# Patient Record
Sex: FEMALE | Race: WHITE | HISPANIC OR LATINO | Employment: FULL TIME | URBAN - METROPOLITAN AREA
[De-identification: names, ages, dates, MRNs, and addresses within clinical notes are randomized per-mention and may not be internally consistent; named-entity substitution may affect disease eponyms.]

---

## 2017-04-18 ENCOUNTER — ALLSCRIPTS OFFICE VISIT (OUTPATIENT)
Dept: OTHER | Facility: OTHER | Age: 32
End: 2017-04-18

## 2017-04-18 DIAGNOSIS — E66.3 OVERWEIGHT(278.02): ICD-10-CM

## 2017-06-15 ENCOUNTER — ALLSCRIPTS OFFICE VISIT (OUTPATIENT)
Dept: OTHER | Facility: OTHER | Age: 32
End: 2017-06-15

## 2017-08-14 ENCOUNTER — LAB REQUISITION (OUTPATIENT)
Dept: LAB | Facility: HOSPITAL | Age: 32
End: 2017-08-14
Payer: COMMERCIAL

## 2017-08-14 ENCOUNTER — ALLSCRIPTS OFFICE VISIT (OUTPATIENT)
Dept: OTHER | Facility: OTHER | Age: 32
End: 2017-08-14

## 2017-08-14 DIAGNOSIS — Z11.3 ENCOUNTER FOR SCREENING FOR INFECTIONS WITH PREDOMINANTLY SEXUAL MODE OF TRANSMISSION: ICD-10-CM

## 2017-08-14 DIAGNOSIS — Z01.419 ENCOUNTER FOR GYNECOLOGICAL EXAMINATION WITHOUT ABNORMAL FINDING: ICD-10-CM

## 2017-08-14 DIAGNOSIS — Z72.51 HIGH RISK HETEROSEXUAL BEHAVIOR: ICD-10-CM

## 2017-08-14 PROCEDURE — G0145 SCR C/V CYTO,THINLAYER,RESCR: HCPCS | Performed by: NURSE PRACTITIONER

## 2017-08-14 PROCEDURE — 87624 HPV HI-RISK TYP POOLED RSLT: CPT | Performed by: NURSE PRACTITIONER

## 2017-08-14 PROCEDURE — 87591 N.GONORRHOEAE DNA AMP PROB: CPT | Performed by: NURSE PRACTITIONER

## 2017-08-14 PROCEDURE — 87491 CHLMYD TRACH DNA AMP PROBE: CPT | Performed by: NURSE PRACTITIONER

## 2017-08-16 ENCOUNTER — APPOINTMENT (OUTPATIENT)
Dept: LAB | Facility: CLINIC | Age: 32
End: 2017-08-16
Payer: COMMERCIAL

## 2017-08-16 ENCOUNTER — TRANSCRIBE ORDERS (OUTPATIENT)
Dept: LAB | Facility: CLINIC | Age: 32
End: 2017-08-16

## 2017-08-16 DIAGNOSIS — Z11.3 ENCOUNTER FOR SCREENING FOR INFECTIONS WITH PREDOMINANTLY SEXUAL MODE OF TRANSMISSION: ICD-10-CM

## 2017-08-16 DIAGNOSIS — E66.9 OBESITY, UNSPECIFIED: ICD-10-CM

## 2017-08-16 DIAGNOSIS — E66.9 OBESITY, UNSPECIFIED: Primary | ICD-10-CM

## 2017-08-16 LAB
25(OH)D3 SERPL-MCNC: 26.5 NG/ML (ref 30–100)
ALBUMIN SERPL BCP-MCNC: 3.6 G/DL (ref 3.5–5)
ALP SERPL-CCNC: 88 U/L (ref 46–116)
ALT SERPL W P-5'-P-CCNC: 29 U/L (ref 12–78)
ANION GAP SERPL CALCULATED.3IONS-SCNC: 7 MMOL/L (ref 4–13)
AST SERPL W P-5'-P-CCNC: 13 U/L (ref 5–45)
BILIRUB SERPL-MCNC: 0.32 MG/DL (ref 0.2–1)
BUN SERPL-MCNC: 8 MG/DL (ref 5–25)
CALCIUM SERPL-MCNC: 9.1 MG/DL (ref 8.3–10.1)
CHLAMYDIA DNA CVX QL NAA+PROBE: NORMAL
CHLORIDE SERPL-SCNC: 106 MMOL/L (ref 100–108)
CHOLEST SERPL-MCNC: 150 MG/DL (ref 50–200)
CO2 SERPL-SCNC: 26 MMOL/L (ref 21–32)
CREAT SERPL-MCNC: 0.79 MG/DL (ref 0.6–1.3)
EST. AVERAGE GLUCOSE BLD GHB EST-MCNC: 111 MG/DL
GFR SERPL CREATININE-BSD FRML MDRD: 100 ML/MIN/1.73SQ M
GLUCOSE P FAST SERPL-MCNC: 90 MG/DL (ref 65–99)
HBA1C MFR BLD: 5.5 % (ref 4.2–6.3)
HDLC SERPL-MCNC: 38 MG/DL (ref 40–60)
HPV RRNA GENITAL QL NAA+PROBE: NORMAL
LDLC SERPL CALC-MCNC: 80 MG/DL (ref 0–100)
N GONORRHOEA DNA GENITAL QL NAA+PROBE: NORMAL
POTASSIUM SERPL-SCNC: 3.8 MMOL/L (ref 3.5–5.3)
PROT SERPL-MCNC: 8.1 G/DL (ref 6.4–8.2)
SODIUM SERPL-SCNC: 139 MMOL/L (ref 136–145)
TRIGL SERPL-MCNC: 160 MG/DL
TSH SERPL DL<=0.05 MIU/L-ACNC: 0.71 UIU/ML (ref 0.36–3.74)

## 2017-08-16 PROCEDURE — 80053 COMPREHEN METABOLIC PANEL: CPT

## 2017-08-16 PROCEDURE — 82306 VITAMIN D 25 HYDROXY: CPT

## 2017-08-16 PROCEDURE — 36415 COLL VENOUS BLD VENIPUNCTURE: CPT

## 2017-08-16 PROCEDURE — 86592 SYPHILIS TEST NON-TREP QUAL: CPT

## 2017-08-16 PROCEDURE — 83036 HEMOGLOBIN GLYCOSYLATED A1C: CPT

## 2017-08-16 PROCEDURE — 87340 HEPATITIS B SURFACE AG IA: CPT

## 2017-08-16 PROCEDURE — 80061 LIPID PANEL: CPT

## 2017-08-16 PROCEDURE — 87389 HIV-1 AG W/HIV-1&-2 AB AG IA: CPT

## 2017-08-16 PROCEDURE — 84443 ASSAY THYROID STIM HORMONE: CPT

## 2017-08-17 LAB
HBV SURFACE AG SER QL: NORMAL
RPR SER QL: NORMAL

## 2017-08-18 LAB
HIV 1+2 AB+HIV1 P24 AG SERPL QL IA: NORMAL
LAB AP GYN PRIMARY INTERPRETATION: NORMAL
Lab: NORMAL

## 2018-01-11 NOTE — MISCELLANEOUS
Provider Comments  Provider Comments:   Patient missed her 8:40am appt and was rescheduled to 11:40 [address] [facility]        Signatures   Electronically signed by : Jenene Krabbe, PAC; Jun 16 2017  7:59AM EST

## 2018-01-12 VITALS
TEMPERATURE: 96.4 F | OXYGEN SATURATION: 98 % | RESPIRATION RATE: 18 BRPM | HEIGHT: 65 IN | SYSTOLIC BLOOD PRESSURE: 120 MMHG | DIASTOLIC BLOOD PRESSURE: 70 MMHG | BODY MASS INDEX: 27.24 KG/M2 | HEART RATE: 84 BPM | WEIGHT: 163.5 LBS

## 2018-01-13 VITALS
SYSTOLIC BLOOD PRESSURE: 114 MMHG | WEIGHT: 164 LBS | DIASTOLIC BLOOD PRESSURE: 75 MMHG | HEIGHT: 65 IN | BODY MASS INDEX: 27.32 KG/M2

## 2018-01-14 VITALS
RESPIRATION RATE: 18 BRPM | HEIGHT: 65 IN | DIASTOLIC BLOOD PRESSURE: 78 MMHG | SYSTOLIC BLOOD PRESSURE: 114 MMHG | BODY MASS INDEX: 27.85 KG/M2 | HEART RATE: 80 BPM | OXYGEN SATURATION: 100 % | WEIGHT: 167.13 LBS | TEMPERATURE: 96.8 F

## 2018-08-28 ENCOUNTER — APPOINTMENT (OUTPATIENT)
Dept: LAB | Facility: CLINIC | Age: 33
End: 2018-08-28
Payer: COMMERCIAL

## 2018-08-28 ENCOUNTER — OFFICE VISIT (OUTPATIENT)
Dept: FAMILY MEDICINE CLINIC | Facility: CLINIC | Age: 33
End: 2018-08-28
Payer: COMMERCIAL

## 2018-08-28 ENCOUNTER — OFFICE VISIT (OUTPATIENT)
Dept: OBGYN CLINIC | Facility: CLINIC | Age: 33
End: 2018-08-28
Payer: COMMERCIAL

## 2018-08-28 ENCOUNTER — TRANSCRIBE ORDERS (OUTPATIENT)
Dept: LAB | Facility: CLINIC | Age: 33
End: 2018-08-28

## 2018-08-28 VITALS
BODY MASS INDEX: 26.17 KG/M2 | DIASTOLIC BLOOD PRESSURE: 74 MMHG | RESPIRATION RATE: 16 BRPM | OXYGEN SATURATION: 98 % | WEIGHT: 157.06 LBS | HEART RATE: 64 BPM | SYSTOLIC BLOOD PRESSURE: 110 MMHG | TEMPERATURE: 96.8 F | HEIGHT: 65 IN

## 2018-08-28 VITALS
DIASTOLIC BLOOD PRESSURE: 70 MMHG | WEIGHT: 156 LBS | BODY MASS INDEX: 25.99 KG/M2 | SYSTOLIC BLOOD PRESSURE: 102 MMHG | HEIGHT: 65 IN

## 2018-08-28 DIAGNOSIS — N92.1 METRORRHAGIA: ICD-10-CM

## 2018-08-28 DIAGNOSIS — Z11.3 SCREENING EXAMINATION FOR STD (SEXUALLY TRANSMITTED DISEASE): Primary | ICD-10-CM

## 2018-08-28 DIAGNOSIS — Z97.5 BREAKTHROUGH BLEEDING WITH IUD: ICD-10-CM

## 2018-08-28 DIAGNOSIS — N92.1 BREAKTHROUGH BLEEDING WITH IUD: ICD-10-CM

## 2018-08-28 DIAGNOSIS — Z02.4 DRIVER'S PERMIT PE (PHYSICAL EXAMINATION): Primary | ICD-10-CM

## 2018-08-28 DIAGNOSIS — M54.50 CHRONIC BILATERAL LOW BACK PAIN WITHOUT SCIATICA: ICD-10-CM

## 2018-08-28 DIAGNOSIS — G89.29 CHRONIC BILATERAL LOW BACK PAIN WITHOUT SCIATICA: ICD-10-CM

## 2018-08-28 LAB
B-HCG SERPL-ACNC: <2 MIU/ML
ERYTHROCYTE [DISTWIDTH] IN BLOOD BY AUTOMATED COUNT: 12.9 % (ref 11.6–15.1)
HCT VFR BLD AUTO: 37.2 % (ref 34.8–46.1)
HGB BLD-MCNC: 11.5 G/DL (ref 11.5–15.4)
MCH RBC QN AUTO: 27.2 PG (ref 26.8–34.3)
MCHC RBC AUTO-ENTMCNC: 30.9 G/DL (ref 31.4–37.4)
MCV RBC AUTO: 88 FL (ref 82–98)
PLATELET # BLD AUTO: 381 THOUSANDS/UL (ref 149–390)
PMV BLD AUTO: 10.7 FL (ref 8.9–12.7)
RBC # BLD AUTO: 4.23 MILLION/UL (ref 3.81–5.12)
TSH SERPL DL<=0.05 MIU/L-ACNC: 1.59 UIU/ML (ref 0.36–3.74)
WBC # BLD AUTO: 6.27 THOUSAND/UL (ref 4.31–10.16)

## 2018-08-28 PROCEDURE — 87591 N.GONORRHOEAE DNA AMP PROB: CPT | Performed by: OBSTETRICS & GYNECOLOGY

## 2018-08-28 PROCEDURE — 85027 COMPLETE CBC AUTOMATED: CPT

## 2018-08-28 PROCEDURE — 99214 OFFICE O/P EST MOD 30 MIN: CPT | Performed by: OBSTETRICS & GYNECOLOGY

## 2018-08-28 PROCEDURE — 84443 ASSAY THYROID STIM HORMONE: CPT

## 2018-08-28 PROCEDURE — 84702 CHORIONIC GONADOTROPIN TEST: CPT

## 2018-08-28 PROCEDURE — 87491 CHLMYD TRACH DNA AMP PROBE: CPT | Performed by: OBSTETRICS & GYNECOLOGY

## 2018-08-28 PROCEDURE — 99214 OFFICE O/P EST MOD 30 MIN: CPT | Performed by: PHYSICIAN ASSISTANT

## 2018-08-28 PROCEDURE — 36415 COLL VENOUS BLD VENIPUNCTURE: CPT

## 2018-08-28 NOTE — PROGRESS NOTES
Assessment/Plan:     Diagnoses and all orders for this visit:    Metrorrhagia  -     TSH, 3rd generation with Free T4 reflex; Future  -     hCG, quantitative; Future  -     CBC; Future  -     US pelvis complete w transvaginal; Future    Breakthrough bleeding with IUD  -     TSH, 3rd generation with Free T4 reflex; Future  -     hCG, quantitative; Future  -     CBC; Future  -     US pelvis complete w transvaginal; Future    Screening examination for STD (sexually transmitted disease)  -     Chlamydia/GC amplified DNA by PCR; Future            Discussed with patient options for contraception  Patient states she would like to use condoms if the Intrauterine device has to be removed  She did not want the Intrauterine device to be removed today  Breakthrough bleeding certainly could be because of the Intrauterine device but we will look for other causes including uterine fibroids as well as endometrial polyps  CBC, TSH an HCG was also ordered  She was given Aygestin 10 mg p o  Tablet daily to stop heavy bleeding  She was asked to follow up in the office in about 1-2 weeks for pelvic ultrasound  Subjective   Patient ID: Francisco Alexander is a 29 y o  female  Patient is a 56-year-old  2 para  who presents for problem visit  She is complaining of irregular periods since 2017  She had a ParaGard Intrauterine device placed in 2014 in [place]  She states that she gets a period at least 2 times per month  Her periods last for about 5 days  Her last period started on 2018  She describes it as light but she does change a pad and tampon every hr    OB History      Para Term  AB Living    3 2 2 0 1 2    SAB TAB Ectopic Multiple Live Births    1 0 0 0 1            Review of Systems   Constitutional: Negative  HENT: Negative  Eyes: Negative  Respiratory: Negative  Cardiovascular: Negative  Gastrointestinal: Negative  Endocrine: Negative  Genitourinary:        As noted in HPI   Musculoskeletal: Negative  Skin: Negative  Allergic/Immunologic: Negative  Neurological: Negative  Hematological: Negative  Psychiatric/Behavioral: Negative  Vitals:    18 0714   BP: 102/70         Physical Exam   Constitutional: She is oriented to person, place, and time  She appears well-developed and well-nourished  No distress  Genitourinary: Uterus normal  Pelvic exam was performed with patient supine  There is no rash, tenderness, lesion or injury on the right labia  There is no rash, tenderness, lesion or injury on the left labia  There is bleeding (moderate amount) in the vagina  No erythema or tenderness in the vagina  No signs of injury around the vagina  No vaginal discharge found  Right adnexum does not display mass, does not display tenderness and does not display fullness  Left adnexum does not display mass, does not display tenderness and does not display fullness  Cervix exhibits visible IUD strings  Cervix does not exhibit motion tenderness, lesion, discharge or polyp  Uterus is not enlarged or tender  Abdominal: Soft  She exhibits no distension  There is no tenderness  Neurological: She is alert and oriented to person, place, and time  Skin: Skin is warm and dry  Psychiatric: She has a normal mood and affect  Her behavior is normal          Menstrual History:  OB History      Para Term  AB Living    3 2 2 0 1 2    SAB TAB Ectopic Multiple Live Births    1 0 0 0 1           Patient's last menstrual period was 2018 (exact date)  Period Pattern: (!) Irregular (bleeds 2x month since 2017)  Menstrual Flow: Light  Menstrual Control: Thin pad, Maxi pad, Tampon  Menstrual Control Change Freq (Hours):  1  Dysmenorrhea: (!) Mild  Dysmenorrhea Symptoms: Cramping    The following portions of the patient's history were reviewed and updated as appropriate: allergies, current medications, past family history, past medical history, past social history, past surgical history and problem list         Counseling / Coordination of Care  Total time spent today30 minutes  minutes  Greater than 50% of total time was spent with the patient and / or family counseling and / or coordination of care

## 2018-08-28 NOTE — PROGRESS NOTES
Assessment/Plan:    Chronic bilateral low back pain without sciatica  Recommend PT and xray  Follow up after for recheck  Take tylenol for pain         Problem List Items Addressed This Visit        Other    Chronic bilateral low back pain without sciatica     Recommend PT and xray  Follow up after for recheck  Take tylenol for pain         Relevant Orders    Ambulatory referral to Physical Therapy    XR spine lumbar minimum 4 views non injury      Other Visit Diagnoses     's permit PE (physical examination)    -  Primary        permit form signed    Subjective:      Patient ID: Lrary Betancur is a 29 y o  female  HPI  Here for drivers permit  She needs referral to orthopedics for pain in the hip  She has been having this for many months  Pain is across the back bilaterally  No medications tried for it  Pain is sharp and constant  No activity makes it worse or better  The following portions of the patient's history were reviewed and updated as appropriate:   She  has no past medical history on file  She   Patient Active Problem List    Diagnosis Date Noted    Chronic bilateral low back pain without sciatica 08/28/2018     She  has a past surgical history that includes Cardiac catheterization and BREAST IMPLANT  Her family history includes Diabetes in her father; Hypertension in her father and mother  She  reports that she has never smoked  She has never used smokeless tobacco  She reports that she drinks alcohol  She reports that she does not use drugs  Current Outpatient Prescriptions   Medication Sig Dispense Refill    norethindrone (AYGESTIN) 5 mg tablet Take 1 tablet (5 mg total) by mouth daily 60 tablet 3     No current facility-administered medications for this visit  No current outpatient prescriptions on file prior to visit  No current facility-administered medications on file prior to visit  She has No Known Allergies       Review of Systems   Constitutional: Negative for activity change, appetite change, chills, fatigue and unexpected weight change  HENT: Negative for dental problem, ear pain, hearing loss and sore throat  Eyes: Negative for visual disturbance  Respiratory: Negative for cough and wheezing  Cardiovascular: Negative for chest pain  Gastrointestinal: Negative for abdominal pain, constipation, diarrhea and vomiting  Genitourinary: Negative for difficulty urinating and dysuria  Musculoskeletal: Negative for arthralgias, back pain and myalgias  Skin: Negative for rash  Neurological: Negative for dizziness and headaches  Psychiatric/Behavioral: Negative for behavioral problems  Objective:      /74 (BP Location: Right arm, Patient Position: Sitting, Cuff Size: Standard)   Pulse 64   Temp (!) 96 8 °F (36 °C) (Tympanic)   Resp 16   Ht 5' 5" (1 651 m)   Wt 71 2 kg (157 lb 1 oz)   LMP 08/22/2018 (Exact Date)   SpO2 98%   BMI 26 14 kg/m²          Physical Exam   Constitutional: She is oriented to person, place, and time  She appears well-developed and well-nourished  HENT:   Head: Normocephalic and atraumatic  Right Ear: External ear normal    Left Ear: External ear normal    Nose: Nose normal    Mouth/Throat: Oropharynx is clear and moist    Eyes: Conjunctivae are normal    Neck: Normal range of motion  Neck supple  No thyromegaly present  Cardiovascular: Normal rate, regular rhythm and normal heart sounds  No murmur heard  Pulmonary/Chest: Effort normal and breath sounds normal  No respiratory distress  Abdominal: Soft  Bowel sounds are normal  She exhibits no mass  There is no tenderness  There is no guarding  Musculoskeletal: Normal range of motion  She exhibits tenderness (tender over lumbar spine L3-4)  Lymphadenopathy:     She has no cervical adenopathy  Neurological: She is alert and oriented to person, place, and time  She displays normal reflexes  Skin: Skin is warm     Psychiatric: She has a normal mood and affect  Her behavior is normal    Nursing note and vitals reviewed

## 2018-08-31 LAB
CHLAMYDIA DNA CVX QL NAA+PROBE: NORMAL
N GONORRHOEA DNA GENITAL QL NAA+PROBE: NORMAL

## 2018-09-04 ENCOUNTER — TELEPHONE (OUTPATIENT)
Dept: OBGYN CLINIC | Facility: CLINIC | Age: 33
End: 2018-09-04

## 2018-09-04 NOTE — TELEPHONE ENCOUNTER
----- Message from Edmundo Herman MD sent at 9/4/2018  7:28 AM EDT -----  Notify pt negative gonorrhea and chlamydia testing

## 2018-09-05 ENCOUNTER — APPOINTMENT (OUTPATIENT)
Dept: LAB | Facility: HOSPITAL | Age: 33
End: 2018-09-05
Attending: OBSTETRICS & GYNECOLOGY
Payer: COMMERCIAL

## 2018-09-05 ENCOUNTER — TRANSCRIBE ORDERS (OUTPATIENT)
Dept: ADMINISTRATIVE | Facility: HOSPITAL | Age: 33
End: 2018-09-05

## 2018-09-05 ENCOUNTER — HOSPITAL ENCOUNTER (OUTPATIENT)
Dept: ULTRASOUND IMAGING | Facility: HOSPITAL | Age: 33
Discharge: HOME | End: 2018-09-05
Attending: OBSTETRICS & GYNECOLOGY
Payer: COMMERCIAL

## 2018-09-05 DIAGNOSIS — N92.1 BREAKTHROUGH BLEEDING WITH IUD: ICD-10-CM

## 2018-09-05 DIAGNOSIS — Z97.5 BREAKTHROUGH BLEEDING WITH IUD: ICD-10-CM

## 2018-09-05 DIAGNOSIS — Z11.3 SCREENING EXAMINATION FOR STD (SEXUALLY TRANSMITTED DISEASE): ICD-10-CM

## 2018-09-05 DIAGNOSIS — Z11.3 SCREENING EXAMINATION FOR STD (SEXUALLY TRANSMITTED DISEASE): Primary | ICD-10-CM

## 2018-09-05 DIAGNOSIS — N92.1 METRORRHAGIA: ICD-10-CM

## 2018-09-05 PROCEDURE — 76830 TRANSVAGINAL US NON-OB: CPT

## 2018-09-05 PROCEDURE — 87491 CHLMYD TRACH DNA AMP PROBE: CPT

## 2018-09-05 PROCEDURE — 87591 N.GONORRHOEAE DNA AMP PROB: CPT

## 2018-09-05 PROCEDURE — 76856 US EXAM PELVIC COMPLETE: CPT

## 2018-09-06 LAB
CHLAMYDIA DNA CVX QL NAA+PROBE: NORMAL
N GONORRHOEA DNA GENITAL QL NAA+PROBE: NORMAL

## 2018-09-11 ENCOUNTER — OFFICE VISIT (OUTPATIENT)
Dept: OBGYN CLINIC | Facility: CLINIC | Age: 33
End: 2018-09-11
Payer: COMMERCIAL

## 2018-09-11 VITALS — WEIGHT: 160.2 LBS | SYSTOLIC BLOOD PRESSURE: 104 MMHG | BODY MASS INDEX: 26.66 KG/M2 | DIASTOLIC BLOOD PRESSURE: 86 MMHG

## 2018-09-11 DIAGNOSIS — Z30.432 ENCOUNTER FOR IUD REMOVAL: Primary | ICD-10-CM

## 2018-09-11 DIAGNOSIS — N92.1 METRORRHAGIA: ICD-10-CM

## 2018-09-11 DIAGNOSIS — N92.1 BREAKTHROUGH BLEEDING ASSOCIATED WITH INTRAUTERINE DEVICE (IUD): ICD-10-CM

## 2018-09-11 DIAGNOSIS — Z97.5 BREAKTHROUGH BLEEDING ASSOCIATED WITH INTRAUTERINE DEVICE (IUD): ICD-10-CM

## 2018-09-11 PROCEDURE — 99213 OFFICE O/P EST LOW 20 MIN: CPT | Performed by: OBSTETRICS & GYNECOLOGY

## 2018-09-11 PROCEDURE — 58301 REMOVE INTRAUTERINE DEVICE: CPT | Performed by: OBSTETRICS & GYNECOLOGY

## 2018-09-11 NOTE — PROGRESS NOTES
Assessment/Plan     Diagnoses and all orders for this visit:    Encounter for IUD removal  -     Iud removal    Metrorrhagia    Breakthrough bleeding associated with intrauterine device (IUD)  -     Iud removal        Discussed with patient options to treat bleeding associated with the Intrauterine device including treatment with medication, expectant management versus removal of the device  Patient would like to have the Intrauterine device removed today  Patient would like to use condoms for contraception instead  Please see procedure note for Intrauterine device removal   Her last Pap smear was in 2017 and was normal   Follow-up in 1 month for annual gyn exam       Basim Tomas is an 29 y o  woman who presents for discussion of results  She has no new complaints today  Patient presented 2 weeks ago complaining of of bleeding in between menstrual cycles  She has had the ParaGard Intrauterine device since 2014  Patient complained of bleeding in between menstrual cycles lasting for about 5 days  She had laboratory testing including a pelvic ultrasound  Pelvic ultrasound shows no endometrial polyps or fibroids  Intrauterine device seems to be in the correct location  Laboratory testing for CBC, TSH an HCG was negative  OB History      Para Term  AB Living    3 2 2 0 1 2    SAB TAB Ectopic Multiple Live Births    1 0 0 0 1          The following portions of the patient's history were reviewed and updated as appropriate: allergies, current medications, past family history, past medical history, past social history, past surgical history and problem list       Review of Systems   Constitutional: Negative  HENT: Negative  Eyes: Negative  Respiratory: Negative  Cardiovascular: Negative  Gastrointestinal: Negative  Endocrine: Negative  Genitourinary:        As noted in HPI   Musculoskeletal: Negative  Skin: Negative  Allergic/Immunologic: Negative  Neurological: Negative  Hematological: Negative  Psychiatric/Behavioral: Negative  Objective    Vitals:    09/11/18 0938   BP: 104/86       Physical Exam   Constitutional: She is oriented to person, place, and time  She appears well-developed and well-nourished  No distress  Genitourinary: Uterus normal  Pelvic exam was performed with patient supine  There is no rash, tenderness, lesion or injury on the right labia  There is no rash, tenderness, lesion or injury on the left labia  No erythema, tenderness or bleeding in the vagina  No signs of injury around the vagina  No vaginal discharge found  Right adnexum does not display mass, does not display tenderness and does not display fullness  Left adnexum does not display mass, does not display tenderness and does not display fullness  Cervix exhibits visible IUD strings  Cervix does not exhibit motion tenderness, lesion, discharge or polyp  Uterus is not enlarged or tender  Abdominal: Soft  She exhibits no distension  There is no tenderness  Neurological: She is alert and oriented to person, place, and time  Skin: Skin is warm and dry  Psychiatric: She has a normal mood and affect  Her behavior is normal      Iud removal  Date/Time: 9/11/2018 10:03 AM  Performed by: Coretta Welch  Authorized by: Coretta Welch     Consent:     Consent obtained:  Written and verbal    Consent given by:  Patient and spouse    Procedure risks and benefits discussed: yes      Patient questions answered: yes      Patient agrees, verbalizes understanding, and wants to proceed: yes    Procedure:     Removed with no complications: yes      Removal due to mechanical complications of IUD: yes      Other reason for removal:  Bleeding              Counseling / Coordination of Care  Total time spent today20 minutes  minutes    Greater than 50% of total time was spent with the patient and / or family counseling and / or coordination of care

## 2018-10-09 ENCOUNTER — OFFICE VISIT (OUTPATIENT)
Dept: OBGYN CLINIC | Facility: CLINIC | Age: 33
End: 2018-10-09
Payer: COMMERCIAL

## 2018-10-09 VITALS
SYSTOLIC BLOOD PRESSURE: 110 MMHG | DIASTOLIC BLOOD PRESSURE: 74 MMHG | HEIGHT: 65 IN | WEIGHT: 159 LBS | BODY MASS INDEX: 26.49 KG/M2

## 2018-10-09 DIAGNOSIS — Z01.411 ENCNTR FOR GYN EXAM (GENERAL) (ROUTINE) W ABNORMAL FINDINGS: Primary | ICD-10-CM

## 2018-10-09 PROBLEM — N92.1 METRORRHAGIA: Status: RESOLVED | Noted: 2018-09-11 | Resolved: 2018-10-09

## 2018-10-09 PROBLEM — Z97.5 BREAKTHROUGH BLEEDING ASSOCIATED WITH INTRAUTERINE DEVICE (IUD): Status: RESOLVED | Noted: 2018-09-11 | Resolved: 2018-10-09

## 2018-10-09 PROBLEM — N92.1 BREAKTHROUGH BLEEDING ASSOCIATED WITH INTRAUTERINE DEVICE (IUD): Status: RESOLVED | Noted: 2018-09-11 | Resolved: 2018-10-09

## 2018-10-09 PROCEDURE — 99395 PREV VISIT EST AGE 18-39: CPT | Performed by: OBSTETRICS & GYNECOLOGY

## 2018-10-09 NOTE — PATIENT INSTRUCTIONS
Autoexamen del seno para las mujeres   CUIDADO AMBULATORIO:   Un autoexamen de seno  es pablo manera de revisar si renate mamas tienen protuberancias u otros cambios  Los autoexámenes de seno regulares pueden ayudarla a conocer cómo se dudley y se sienten renate senos normalmente  La mayoría de las protuberancias o cambios en el seno  no son cáncer, kayden usted siempre debería ir con nelson médico para que la revise  Por qué usted debe hacerse un autoexamen de seno:  El cáncer de seno es el tipo de cáncer más común en las mujeres  Aún si a usted le [employer], todavía puede seguir revisándose regularmente  Si usted sabe cómo se sienten y se dudley renate senos normalmente, eso podría ayudarle a determinar cuándo comunicarse con nelson médico  Es posible que pablo mamografía no detecte algún cáncer  Usted podría encontrar pablo protuberancia vern un autoexamen de seno que no se detectó con nelson mamografía  Cuándo debería usted realizarse un autoexamen de seno:  Marcello nelson calendario para que recuerde hacerse un autoexamen del seno en pablo fecha regular  Pablo forma fácil de recordar es realizar el autoexamen de seno en el mismo día cada mes  Si usted tiene [place], es posible que lo mejor sea que se roes un autoexamen 1 semana después de que terminó nelson periodo  [place] es cuando renate senos podrían estar menos inflamados, abultados o sensibles  Usted puede realizarse autoexámenes del seno regulares incluso si está dando de lactar o si tiene implantes en el seno  Pregúntele a nelson Terr[place] vitaminas y minerales son adecuados para usted  · Usted encuentra algún tipo de bulto o cambio en renate senos  · Usted tiene [employer], o le sale líquido de renate pezones  · Usted tiene preguntas o inquietudes acerca de nelson condición o cuidado  Cómo realizar un autoexamen de seno:   · Observe renate senos en un may  Observe el tamaño y la forma de cada seno y del pezón   Revise si hay inflamación, protuberancias, hoyuelos, piel descamada u otros cambios en la piel  Vigile los cambios en el pezón, francisca cuando tiene dolor o que comienza a hundirse  Apriete cuidadosamente ambos pezones y revise si sale líquido (que no sea [employer]) de ellos  Si usted detecta cualquiera de estos u otros cambios en renate senos, comuníquese con nelson médico  Revise renate senos mientras está sentada o saha en las 3 siguientes posiciones:    ¨ Cuelgue renate brazos en renate costados  ¨ Levante renate ajye y [place] detrás de nelson rishabh  ¨ Ejerza presión firme con renate jaye sobre renate caderas  Inclínese un poco hacia adelante mientras observa renate senos en el may  · Acuéstese y palpe renate senos  Cuando usted se [place], el tejido de renate senos se extiende uniformemente sobre nelson pecho  [place] facilita que usted sienta protuberancias o cualquier cosa que podría no ser normal para renate senos  Realice un autoexamen con un seno a la vez  ¨ Coloque pablo almohada pequeña o pablo toalla debajo de nelson hombro sondra  Coloque nelson brazo sondra detrás de nelson rishabh  ¨ Use los 3 dedos medios de nelson mano derecha  Use las yemas de los dedos, en la parte superior  La yema del dedo es la parte más sensible de nelson dedo  ¨ Es círculos pequeños para sentir el tejido del seno  Use las yemas de renate dedos para hacer círculos pequeños empalmados sobre renate senos y [place]  Use presión ligera, media y firme  [place], presione ligeramente  Después, presione con pablo presión media para sentir un poco más profundo en nelson seno  Por último, use presión firme para sentir nelson seno en lo más profundo  ¨ Examine el área completa de nelson seno  Examine el área del seno desde arriba hasta abajo donde usted siente las [place]  Jem Rinne pequeños con las yemas de los dedos, comenzando en la parte media de nelson axila  Jem Rinne subiendo y [place] el área del seno  Continúe hacia nelson seno, hasta llegar al [employer]   Examine toda el área desde la axila hasta el centro de nelson pecho ([place])  Deténgase en el centro de mcdowell pecho  ¨ Mueva la almohada o toalla hacia mcdowell hombro derecho y coloque mcdowell brazo derecho detrás de mcdowell rishabh  Use las yemas de los 3 dedos medios de mcdowell mano izquierda y repita los pasos anteriores para realizar un autoexamen en mcdowell seno derecho  Qué más se puede hacer para la revisión de problemas en el seno o del cáncer de seno:  Algunos expertos sugieren que las mujeres de 36 años de edad y mayores deberían realizarse pablo mamografía cada año  Otros sugieren [employer] 49 y 76 años de edad se maría pablo mamografía cada 2 años  Consulte con mcdowell médico sobre cuándo usted debería [employer]  Acuda a renate consultas de control con mcdowell médico según le indicaron  Mcdowell médico puede observarla e indicarle si usted está realizando mcdowell autoexamen correctamente  Anote renate preguntas para que se acuerde de hacerlas vern renate visitas  © 2017 [facility] Information is for End User's use only and may not be sold, redistributed or otherwise used for commercial purposes  All illustrations and images included in CareNotes® are the copyrighted property of A D A M , Inc  or Blaise Loomis  Esta información es sólo para uso en educación  Mcdowell intención no es darle un consejo médico sobre enfermedades o tratamientos  Colsulte con mcdowell Classie Barrios farmacéutico antes de seguir cualquier régimen médico para saber si es seguro y efectivo para usted  Visita de bienestar para los adultos   LO QUE NECESITA SABER:   ¿Qué es pablo visita de bienestar? Matt Malhotra de bienestar es cuando usted acude con un médico para que le maría exámenes de detección de problemas de [place]  También puede obtener asesoramiento sobre cómo mantenerse saludable  Anote renate preguntas para que se acuerde de hacerlas  Pregunte a mcdowell médico con qué frecuencia debería realizarse pablo visita de bienestar  ¿Qupe sucede en pablo visita de bienestar?   Mcdowell médico le preguntará sobre mcdowell kareen y nelson historia familiar [id number] [facility]  [place] incluye presión arterial julio, enfermedades del corazón y cáncer  El médico le preguntará si tiene síntomas que le preocupen, si cami y [place] de ánimo  También se le preguntará acerca del uso de medicamentos, suplementos, alimentos y alcohol  Es posible que le maría cualquiera de lo siguiente:  · Nelson peso  será revisado  Es posible que [facility] midan nelson altura para calcular nelson índice de masa corporal [facility])  El [facility] indica si tiene un peso saludable  · Se verificarán nelson presión arterial  y frecuencia cardíaca  También pueden revisar nelson temperatura  · Exámenes de [place] y [facility]  se podría realizar  Se podrían realizar exámenes de kelley para revisar los niveles de [facility]  Los niveles anormales de colesterol aumentan el riesgo de enfermedad del corazón y accidente cerebrovascular  Puede que también necesite pablo prueba de kelley u orina para revisar si tiene diabetes si usted está en mayor riesgo  Las pruebas de orina pueden hacerse para buscar signos de pablo infección o pablo enfermedad renal      · Un examen físico  incluye la comprobación de renate latidos del corazón y los pulmones con un estetoscopio  Nelson médico también podría revisarle la piel para buscar daños causados por el sol  · Pruebas de detección  podría recomendarse  Se realiza un examen de detección para detectar enfermedades que pueden no causar síntomas  Los exámenes de detección que necesite dependen de nelson edad, género, antecedentes familiares y hábitos de saundra  Por ejemplo, podrían recomendarle la exploración selectiva colorrectal si tiene 48 años o más  ¿Qué exámenes de detección necesito si soy pablo pauline? · El examen de Papanicolaou  se utiliza para detectar cáncer de sonny uterino  El examen del Papanicolaou por lo general se realiza entre 3 a 5 años dependiendo de nelson edad   Puede necesitarlo más a menudo si usted ha tenido [employer] de la prueba de Papanicolaou en el pasado  Pregunte a nelson médico con qué frecuencia debería realizarse un examen de Papanicolaou  · Pablo mamografía  es pablo radiografía de renate senos para detectar cáncer de mama  Los expertos recomiendan [address] cada 2 años empezando a los 48 años de [place]  Es probable que usted necesite [address] a los 52 años o antes si tiene riesgo alto de cáncer de seno  Hable con nelson médico sobre cuándo debe empezar con renate mamografías y con cuánta frecuencia las necesita  ¿Qué vacunas podría necesitar? · Debe recibir Aracelis Angles vacuna contra la influenza  todos los [place] tr[place]idad  La vacuna contra la influenza protege de la gripe  Varios tipos de virus causan la influenza  Debido a que los virus [place] con el [place], es necesaria la producción de nuevas vacunas cada año  · Debe recibir Aracelis Angles vacuna de refuerzo contra el tétanos-difteria (Td)  cada 10 años  Esta vacuna protege contra el tétanos y la difteria  El tétanos es pablo infección severa que puede causar trismo y espasmos musculares dolorosos  La difteria es pablo infección bacterial grave que produce pablo cubierta gruesa en la parte de atrás de la boca y garganta  · Debe recibir la vacuna contra el virus del papiloma humano (VPH)  si usted es pauline y [place] 19 y 32 años o es hombre y [place] 23 y 24 años y nunca la recibió  Esta vacuna protege contra la infección por VPH  El virus del papiloma humano o VPH es la infección más común que se transmite por contacto sexual  El virus del papiloma humano también podría provocar cáncer vaginal, del pene y del ano  · Debe recibir la vacuna antineumocócica  si tiene más de 72 años  La vacuna antineumocócica es pablo inyección que se administra para protegerlo contra pablo enfermedad neumocócica  La enfermedad neumocócica es pablo infección causada por la bacteria neumocócica  La infección puede causar neumonía, meningitis o pablo infección del oído      · Debe recibir la vacuna contra la culebrilla  si tiene 60 años o más, incluso si flores tenido culebrilla antes  La vacuna contra la culebrilla (herpes zóster) es pablo inyección usada para proteger contra el virus zóster que causa la varicela  Deborah es el mismo virus que causa la varicela  La culebrilla es un sarpullido doloroso que se desarrolla en personas que tuvieron varicela o flores estado expuestas al virus  ¿Cómo puedo comer de manera saludable? [place] es un modelo para planear comidas sanas  Muestra los tipos y cantidades de alimentos que deberían ir en un plato  Edson Simple y verduras representan alrededor de la mitad de nelson plato y los granos y proteínas representan la otra mitad  Se incluye pablo porción de productos lácteos al lado del plato  La cantidad de calorías y [address] de las porciones que usted necesita dependen de nelson edad, [place], peso y altura  Los ejemplos de alimentos saludables son:  · Consuma pablo variedad de verduras  francisca las de color kerri oscuro, ortega y [place]  Usted también puede incluir verduras enlatadas bajas en sodio (sal) y verduras congeladas sin mantequilla ni salsas TWEPOHQB  · Consuma pablo variedad de fruta frescas , las frutas enlatadas en 100% de jugo , fruta [place] y spring secos  · Incluya granos enteros  Por lo menos la mitad de los granos que usted consume deben ser granos de diane integral  Por ejemplo, panes de grano entero, pasta integral, arroz integral y cereales de grano entero francisca la elizabeth  · Consuma pablo variedad de alimentos con proteínas francisca mariscos (pescado y crustáceos), Amedeo Spittle y carne de ave sin piel (pavo y immanuel)  Ejemplos de petr magras incluyen pierna, paleta o lomo de puerco y sruthi, solomillo o, lomo de res y darrell Rosado de res extra [place]  Otros alimentos ricos en proteínas son los huevos y sustitutos de [place], frijoles, chícharos, productos de soya, nueces y semillas  · Elija productos lácteos bajos en grasa [employer] o del 1% o yogur, queso y requesón bajos en grasa  · Limite las grasas poco saludables,  francisca la [place], la margarina dura y la [place]  ¿Qué cantidad de actividad física necesito? Realice pablo actividad física por lo menos 30 minutos al día, la mayoría de los días de la [place]  Algunos de los ejercicios incluyen caminar, montar en bicicleta, bailar y nadar  Usted también puede realizar más actividad física usando las escaleras en vez de los ascensores o estacionarse más lejos cuando Daphine Furry a las tiendas  Incluya ejercicios para fortalecer los músculos 2 días a la semana  El ejercicio regular proporciona muchos beneficios para la kareen  [place] a controlar nelson peso y [employer] riesgo de diabetes tipo 2, presión arterial julio, enfermedad del corazón y accidente cerebrovascular  El ejercicio [employer] puede ayudar a mejorar nelson estado de ánimo  Pregunte a nelson médico acerca del mejor plan de ejercicio para usted  ¿Cuáles son Guajardo Roes generales de kareen y seguridad que maia seguir? · No fume  La nicotina y otras sustancias químicas que contienen los cigarrillos y cigarros pueden dañar los pulmones  Pida información a nelson médico si usted actualmente fuma y necesita ayuda para dejar de fumar  Los cigarrillos electrónicos o tabaco sin humo todavía contienen nicotina  Consulte con nelson médico antes de [employer]  · Limite el consumo de alcohol  Un trago equivale a 12 onzas de cerveza, 5 onzas de vino o 1 onza y ½ de licor  · Baje de peso, si es necesario  El sobrepeso aumenta el riesgo de ciertas condiciones de [place]  Estos incluyen enfermedad del corazón, presión arterial julio, diabetes tipo 2 y ciertos tipos de cáncer  · Proteja nelson piel  No tome el sol ni use reese de bronceado  Use protector solar con un SPF 13 o mayor  Aplíquese el bloqueador por lo menos 15 minutos antes de que vaya a estar al [employer]  Vuelva a aplicarse la crema solar cada 2 horas   Use ropa protectora, sombrero y lentes para el sol cuando se encuentre afuera  · Conduzca con seguridad  Use siempre nelson cinturón de seguridad  Asegúrese que todos en el karina usan el cinturón de seguridad  Un cinturón de seguridad puede salvar nelson saundra en nichelle de un accidente automovilístico  No use el celular cuando esté manejando  [place] puede hacer que se distraiga y causar un accidente  Es mejor que pare y se orille si necesita hacer pablo [place] Caroli un texto  · Practique el sexo seguro  Use condones de látex si es sexualmente [place] y tiene más de [place] and [place]  Nelson médico puede recomendar exámenes de detección de infecciones de transmisión sexual (ITS)  · Use un alfredo, un chaleco salvavidas y unos implementos de protección  Siempre use un alfredo al [employer] en bicicleta o motocicleta, esquiar o jugar deportes que podrían causar pablo lesión en la rishabh  Use implementos de protección cuando practique deportes  Use un chaleco salvavidas cuando esté en un bote o practicando actividades acuáticas  ACUERDOS SOBRE NELSON CUIDADO:   Usted tiene el derecho de ayudar a planear nelson cuidado  Aprenda todo lo que pueda sobre nelson condición y francisca darle tratamiento  Discuta renate opciones de tratamiento con renate médicos para decidir el cuidado que usted desea recibir  Usted siempre tiene el derecho de rechazar el tratamiento  Esta información es sólo para uso en educación  Nelson intención no es darle un consejo médico sobre enfermedades o tratamientos  Colsulte con nelson [place] R[place] farmacéutico antes de seguir cualquier régimen médico para saber si es seguro y efectivo para usted  © 2017 [address]  Information is for End User's use only and may not be sold, redistributed or otherwise used for commercial purposes  All illustrations and images included in CareNotes® are the copyrighted property of A D A M , Inc  or Blaise Loomis

## 2018-10-09 NOTE — PROGRESS NOTES
Assessment        Diagnoses and all orders for this visit:    Encntr for gyn exam (general) (routine) w abnormal findings                  Plan      All questions answered  Breast self exam technique reviewed and patient encouraged to perform self-exam monthly  Contraception: condoms  Discussed healthy lifestyle modifications  Educational material distributed  Follow up in 1 year  Follow up as needed  PAP deferred   Patient would like to use condoms for contraception instead  She declines continued treatment with hormonal therapy for abnormal uterine bleeding  She will do expectant management and keep track of her menstrual pattern and call if with any problems  Otherwise, she will follow up in 1 year  Subjective      Arpit Hardin is a 29 y o  female who presents for annual exam   She was seen 1 month ago and had an Intrauterine device removal due to heavy abnormal bleeding  Patient was also given norethindrone acetate to treat abnormal uterine bleeding  Patient is currently not taking any hormonal therapy and is currently not bleeding  Last PAP:  2017      Current contraception: condoms        Menstrual History:  OB History      Para Term  AB Living    3 2 2 0 1 2    SAB TAB Ectopic Multiple Live Births    1 0 0 0 1           Patient's last menstrual period was 10/02/2018  The following portions of the patient's history were reviewed and updated as appropriate: allergies, current medications, past family history, past medical history, past social history, past surgical history and problem list         Review of Systems   Constitutional: Negative  HENT: Negative  Eyes: Negative  Respiratory: Negative  Cardiovascular: Negative  Gastrointestinal: Negative  Endocrine: Negative  Genitourinary:        As noted in HPI   Musculoskeletal: Negative  Skin: Negative  Allergic/Immunologic: Negative  Neurological: Negative  Hematological: Negative  Psychiatric/Behavioral: Negative  Physical Exam   Constitutional: She is oriented to person, place, and time  She appears well-developed and well-nourished  Genitourinary: There is no rash or lesion on the right labia  There is no rash or lesion on the left labia  No bleeding in the vagina  No vaginal discharge found  Right adnexum does not display mass, does not display tenderness and does not display fullness  Left adnexum does not display mass, does not display tenderness and does not display fullness  Cervix does not exhibit motion tenderness, lesion or polyp  Uterus is not enlarged or tender  HENT:   Head: Normocephalic  Neck: No thyromegaly present  Cardiovascular: Normal rate, regular rhythm and normal heart sounds  No murmur heard  Pulmonary/Chest: Effort normal and breath sounds normal  No respiratory distress  She has no wheezes  She has no rales  Right breast exhibits no mass, no nipple discharge, no skin change and no tenderness  Left breast exhibits no mass, no nipple discharge, no skin change and no tenderness  B/l breast implants   Abdominal: Soft  She exhibits no distension and no mass  There is no tenderness  There is no rebound and no guarding  Musculoskeletal: She exhibits no edema or tenderness  Neurological: She is alert and oriented to person, place, and time  Skin: Skin is warm and dry  Psychiatric: She has a normal mood and affect

## 2018-11-02 ENCOUNTER — HOSPITAL ENCOUNTER (EMERGENCY)
Facility: HOSPITAL | Age: 33
Discharge: HOME | End: 2018-11-02
Attending: EMERGENCY MEDICINE | Admitting: EMERGENCY MEDICINE
Payer: COMMERCIAL

## 2018-11-02 VITALS
TEMPERATURE: 97.9 F | HEART RATE: 59 BPM | DIASTOLIC BLOOD PRESSURE: 67 MMHG | OXYGEN SATURATION: 100 % | RESPIRATION RATE: 14 BRPM | SYSTOLIC BLOOD PRESSURE: 119 MMHG

## 2018-11-02 DIAGNOSIS — R42 DIZZINESS: Primary | ICD-10-CM

## 2018-11-02 LAB
ANION GAP SERPL CALCULATED.3IONS-SCNC: 6 MMOL/L (ref 4–13)
BASOPHILS # BLD AUTO: 0.09 THOUSANDS/ΜL (ref 0–0.1)
BASOPHILS NFR BLD AUTO: 2 % (ref 0–1)
BILIRUB UR QL STRIP: NEGATIVE
BUN SERPL-MCNC: 11 MG/DL (ref 5–25)
CALCIUM SERPL-MCNC: 8.9 MG/DL (ref 8.3–10.1)
CHLORIDE SERPL-SCNC: 104 MMOL/L (ref 100–108)
CLARITY UR: CLEAR
CLARITY, POC: CLEAR
CO2 SERPL-SCNC: 30 MMOL/L (ref 21–32)
COLOR UR: YELLOW
COLOR, POC: YELLOW
CREAT SERPL-MCNC: 0.85 MG/DL (ref 0.6–1.3)
EOSINOPHIL # BLD AUTO: 0.21 THOUSAND/ΜL (ref 0–0.61)
EOSINOPHIL NFR BLD AUTO: 4 % (ref 0–6)
ERYTHROCYTE [DISTWIDTH] IN BLOOD BY AUTOMATED COUNT: 12.1 % (ref 11.6–15.1)
EXT PREG TEST URINE: NEGATIVE
GFR SERPL CREATININE-BSD FRML MDRD: 91 ML/MIN/1.73SQ M
GLUCOSE SERPL-MCNC: 118 MG/DL (ref 65–140)
GLUCOSE UR STRIP-MCNC: NEGATIVE MG/DL
HCT VFR BLD AUTO: 35.1 % (ref 34.8–46.1)
HGB BLD-MCNC: 10.6 G/DL (ref 11.5–15.4)
HGB UR QL STRIP.AUTO: NEGATIVE
IMM GRANULOCYTES # BLD AUTO: 0.01 THOUSAND/UL (ref 0–0.2)
IMM GRANULOCYTES NFR BLD AUTO: 0 % (ref 0–2)
KETONES UR STRIP-MCNC: NEGATIVE MG/DL
LEUKOCYTE ESTERASE UR QL STRIP: NEGATIVE
LYMPHOCYTES # BLD AUTO: 2.79 THOUSANDS/ΜL (ref 0.6–4.47)
LYMPHOCYTES NFR BLD AUTO: 49 % (ref 14–44)
MCH RBC QN AUTO: 25.9 PG (ref 26.8–34.3)
MCHC RBC AUTO-ENTMCNC: 30.2 G/DL (ref 31.4–37.4)
MCV RBC AUTO: 86 FL (ref 82–98)
MONOCYTES # BLD AUTO: 0.5 THOUSAND/ΜL (ref 0.17–1.22)
MONOCYTES NFR BLD AUTO: 9 % (ref 4–12)
NEUTROPHILS # BLD AUTO: 2.06 THOUSANDS/ΜL (ref 1.85–7.62)
NEUTS SEG NFR BLD AUTO: 36 % (ref 43–75)
NITRITE UR QL STRIP: NEGATIVE
NRBC BLD AUTO-RTO: 0 /100 WBCS
PH UR STRIP.AUTO: 7 [PH] (ref 4.5–8)
PLATELET # BLD AUTO: 384 THOUSANDS/UL (ref 149–390)
PMV BLD AUTO: 10 FL (ref 8.9–12.7)
POTASSIUM SERPL-SCNC: 3.4 MMOL/L (ref 3.5–5.3)
PROT UR STRIP-MCNC: NEGATIVE MG/DL
RBC # BLD AUTO: 4.09 MILLION/UL (ref 3.81–5.12)
SODIUM SERPL-SCNC: 140 MMOL/L (ref 136–145)
SP GR UR STRIP.AUTO: 1.02 (ref 1–1.03)
UROBILINOGEN UR QL STRIP.AUTO: 0.2 E.U./DL
WBC # BLD AUTO: 5.66 THOUSAND/UL (ref 4.31–10.16)

## 2018-11-02 PROCEDURE — 81003 URINALYSIS AUTO W/O SCOPE: CPT

## 2018-11-02 PROCEDURE — 85025 COMPLETE CBC W/AUTO DIFF WBC: CPT | Performed by: EMERGENCY MEDICINE

## 2018-11-02 PROCEDURE — 93005 ELECTROCARDIOGRAM TRACING: CPT

## 2018-11-02 PROCEDURE — 81025 URINE PREGNANCY TEST: CPT | Performed by: EMERGENCY MEDICINE

## 2018-11-02 PROCEDURE — 96360 HYDRATION IV INFUSION INIT: CPT

## 2018-11-02 PROCEDURE — 80048 BASIC METABOLIC PNL TOTAL CA: CPT | Performed by: EMERGENCY MEDICINE

## 2018-11-02 PROCEDURE — 36415 COLL VENOUS BLD VENIPUNCTURE: CPT | Performed by: EMERGENCY MEDICINE

## 2018-11-02 PROCEDURE — 99284 EMERGENCY DEPT VISIT MOD MDM: CPT

## 2018-11-02 RX ORDER — MECLIZINE HYDROCHLORIDE 25 MG/1
25 TABLET ORAL EVERY 8 HOURS PRN
Qty: 15 TABLET | Refills: 0 | Status: SHIPPED | OUTPATIENT
Start: 2018-11-02 | End: 2019-01-08

## 2018-11-02 RX ORDER — MECLIZINE HCL 12.5 MG/1
25 TABLET ORAL ONCE
Status: COMPLETED | OUTPATIENT
Start: 2018-11-02 | End: 2018-11-02

## 2018-11-02 RX ADMIN — SODIUM CHLORIDE 1000 ML: 0.9 INJECTION, SOLUTION INTRAVENOUS at 17:02

## 2018-11-02 RX ADMIN — MECLIZINE 25 MG: 12.5 TABLET ORAL at 17:00

## 2018-11-02 NOTE — ED PROVIDER NOTES
History  Chief Complaint   Patient presents with    Dizziness     Pt reports walking with her kids and feeling dizzy  Pt sat on the ground and EMS was called  Pt denies CP/SOB  81-year-old female with no past medical history presents to the emergency department complaining of dizziness  Patient is unable to describe the dizziness  She states she was walking with her children and felt dizzy and weak and sat to the ground  No syncopal episode  No chest pain, shortness of breath, headache, nausea or vomiting  Patient states she was in her normal state of health this morning  She has had previous similar episodes in the past   Currently she still is symptomatic  History provided by:  Patient   used: No    Dizziness   Quality:  Unable to specify  Severity:  Unable to specify  Onset quality:  Sudden  Duration:  1 hour  Timing:  Constant  Progression:  Unchanged  Chronicity:  Recurrent  Context: standing up    Relieved by:  None tried  Worsened by:  Sitting upright  Ineffective treatments:  None tried  Associated symptoms: weakness    Associated symptoms: no blood in stool, no chest pain, no diarrhea, no headaches, no hearing loss, no nausea, no palpitations, no shortness of breath, no syncope, no tinnitus, no vision changes and no vomiting    Risk factors: no anemia, no heart disease, no hx of stroke, no hx of vertigo, no Meniere's disease, no multiple medications and no new medications        None       History reviewed  No pertinent past medical history  Past Surgical History:   Procedure Laterality Date    BREAST IMPLANT      CARDIAC CATHETERIZATION         Family History   Problem Relation Age of Onset    Hypertension Mother     Hypertension Father     Diabetes Father      I have reviewed and agree with the history as documented      Social History   Substance Use Topics    Smoking status: Never Smoker    Smokeless tobacco: Never Used    Alcohol use Yes      Comment: social        Review of Systems   Constitutional: Negative for chills, diaphoresis, fatigue and fever  HENT: Negative  Negative for congestion, hearing loss, rhinorrhea, sore throat and tinnitus  Eyes: Negative  Negative for discharge, redness and itching  Respiratory: Negative  Negative for apnea, cough, chest tightness, shortness of breath and wheezing  Cardiovascular: Negative for chest pain, palpitations, leg swelling and syncope  Gastrointestinal: Negative  Negative for abdominal pain, blood in stool, diarrhea, nausea and vomiting  Endocrine: Negative  Genitourinary: Negative  Negative for flank pain, frequency and urgency  Musculoskeletal: Negative  Negative for back pain  Skin: Negative  Allergic/Immunologic: Negative  Neurological: Positive for dizziness and weakness  Negative for tremors, seizures, syncope, facial asymmetry, speech difficulty, light-headedness, numbness and headaches  Hematological: Negative  All other systems reviewed and are negative  Physical Exam  Physical Exam   Constitutional: She is oriented to person, place, and time  She appears well-developed and well-nourished  Non-toxic appearance  She does not have a sickly appearance  She does not appear ill  No distress  HENT:   Head: Normocephalic and atraumatic  Right Ear: Tympanic membrane and external ear normal    Left Ear: Tympanic membrane and external ear normal    Nose: Nose normal    Mouth/Throat: Oropharynx is clear and moist  No oropharyngeal exudate  Eyes: Pupils are equal, round, and reactive to light  Conjunctivae and EOM are normal  Right eye exhibits no discharge  Left eye exhibits no discharge  No scleral icterus  No nystagmus   Neck: Normal range of motion  Neck supple  Cardiovascular: Normal rate, regular rhythm and normal heart sounds  Exam reveals no gallop and no friction rub  No murmur heard    Pulmonary/Chest: Effort normal and breath sounds normal  No respiratory distress  She has no wheezes  She has no rales  She exhibits no tenderness  Abdominal: Soft  Bowel sounds are normal  She exhibits no distension and no mass  There is no tenderness  No hernia  Musculoskeletal: Normal range of motion  She exhibits no edema, tenderness or deformity  Lymphadenopathy:     She has no cervical adenopathy  Neurological: She is alert and oriented to person, place, and time  She has normal reflexes  She displays normal reflexes  No cranial nerve deficit  She exhibits normal muscle tone  Coordination normal    Skin: Skin is warm and dry  No rash noted  She is not diaphoretic  No erythema  No pallor  Psychiatric: She has a normal mood and affect  Nursing note and vitals reviewed        Vital Signs  ED Triage Vitals   Temperature Pulse Respirations Blood Pressure SpO2   11/02/18 1805 11/02/18 1618 11/02/18 1618 11/02/18 1618 11/02/18 1618   97 9 °F (36 6 °C) 65 16 126/79 99 %      Temp Source Heart Rate Source Patient Position - Orthostatic VS BP Location FiO2 (%)   11/02/18 1805 11/02/18 1618 11/02/18 1618 11/02/18 1618 --   Oral Monitor Sitting Right arm       Pain Score       11/02/18 1618       No Pain           Vitals:    11/02/18 1618 11/02/18 1805   BP: 126/79 119/67   Pulse: 65 59   Patient Position - Orthostatic VS: Sitting Lying       Visual Acuity      ED Medications  Medications   sodium chloride 0 9 % bolus 1,000 mL (0 mL Intravenous Stopped 11/2/18 1805)   meclizine (ANTIVERT) tablet 25 mg (25 mg Oral Given 11/2/18 1700)       Diagnostic Studies  Results Reviewed     Procedure Component Value Units Date/Time    POCT urinalysis dipstick [[id number]]  (Normal) Resulted:  11/02/18 1854    Lab Status:  Final result Specimen:  Urine Updated:  11/02/18 1855     Color, UA YELLOW     Clarity, UA CLEAR    POCT pregnancy, urine [[id number]]  (Normal) Resulted:  11/02/18 1854    Lab Status:  Final result Updated:  11/02/18 1854     EXT PREG TEST UR (Ref: Negative) NEGATIVE ED Urine Macroscopic [[id number]] Collected:  11/02/18 1906    Lab Status:  Final result Specimen:  Urine Updated:  11/02/18 1853     Color, UA Yellow     Clarity, UA Clear     pH, UA 7 0     Leukocytes, UA Negative     Nitrite, UA Negative     Protein, UA Negative mg/dl      Glucose, UA Negative mg/dl      Ketones, UA Negative mg/dl      Urobilinogen, UA 0 2 E U /dl      Bilirubin, UA Negative     Blood, UA Negative     Specific Gravity, UA 1 020    Narrative:       CLINITEK RESULT    Basic metabolic panel [[id number]]  (Abnormal) Collected:  11/02/18 1700    Lab Status:  Final result Specimen:  Blood from Arm, Left Updated:  11/02/18 1730     Sodium 140 mmol/L      Potassium 3 4 (L) mmol/L      Chloride 104 mmol/L      CO2 30 mmol/L      ANION GAP 6 mmol/L      BUN 11 mg/dL      Creatinine 0 85 mg/dL      Glucose 118 mg/dL      Calcium 8 9 mg/dL      eGFR 91 ml/min/1 73sq m     Narrative:         National Kidney Disease Education Program recommendations are as follows:  GFR calculation is accurate only with a steady state creatinine  Chronic Kidney disease less than 60 ml/min/1 73 sq  meters  Kidney failure less than 15 ml/min/1 73 sq  meters      CBC and differential [[id number]]  (Abnormal) Collected:  11/02/18 1700    Lab Status:  Final result Specimen:  Blood from Arm, Left Updated:  11/02/18 1708     WBC 5 66 Thousand/uL      RBC 4 09 Million/uL      Hemoglobin 10 6 (L) g/dL      Hematocrit 35 1 %      MCV 86 fL      MCH 25 9 (L) pg      MCHC 30 2 (L) g/dL      RDW 12 1 %      MPV 10 0 fL      Platelets 286 Thousands/uL      nRBC 0 /100 WBCs      Neutrophils Relative 36 (L) %      Immat GRANS % 0 %      Lymphocytes Relative 49 (H) %      Monocytes Relative 9 %      Eosinophils Relative 4 %      Basophils Relative 2 (H) %      Neutrophils Absolute 2 06 Thousands/µL      Immature Grans Absolute 0 01 Thousand/uL      Lymphocytes Absolute 2 79 Thousands/µL      Monocytes Absolute 0 50 Thousand/µL      Eosinophils Absolute 0 21 Thousand/µL      Basophils Absolute 0 09 Thousands/µL                  No orders to display              Procedures  Procedures       Phone Contacts  ED Phone Contact    ED Course                               MDM  Number of Diagnoses or Management Options  Diagnosis management comments: 71-year-old female presents with an episode of dizziness which she cannot describe  She felt weak while she was walking with her children on the sidewalk  She sat down  No syncopal event  She had no other symptoms associated with this episode  Currently she still feels very weak and dizzy  On exam she is in no acute distress  She has no focal deficits on exam   No cerebellar signs  Uncertain the etiology of her symptoms at this time  Highly doubt acute CVA, acute head bleed given her well exam, no headaches  Will check basic labs to rule out electrolyte abnormality  Will rule out infections/pregnancy  Will give IV fluids  Will give Antivert and reassess  Amount and/or Complexity of Data Reviewed  Clinical lab tests: ordered and reviewed  Independent visualization of images, tracings, or specimens: yes      CritCare Time    Disposition  Final diagnoses:   Dizziness     Time reflects when diagnosis was documented in both MDM as applicable and the Disposition within this note     Time User Action Codes Description Comment    11/2/2018  7:35 PM Ravinder MELCHOR Add [R42] Dizziness       ED Disposition     ED Disposition Condition Comment    Discharge  Arpit Hardin discharge to home/self care      Condition at discharge: Good        Follow-up Information     Follow up With Specialties Details Why   Naz Ortega PA-C Family Medicine, Physician Assistant Schedule an appointment as soon as possible for a visit in 3 days  [address] [place] [address] Drive [address] [phone]            Patient's Medications   Discharge Prescriptions    MECLIZINE (ANTIVERT) 25 MG TABLET    Take 1 tablet (25 mg total) by mouth every 8 (eight) hours as needed for dizziness       Start Date: 11/2/2018 End Date: --       Order Dose: 25 mg       Quantity: 15 tablet    Refills: 0     No discharge procedures on file      ED Provider  Electronically Signed by           Michael Lockett DO  11/02/18 1934

## 2018-11-02 NOTE — DISCHARGE INSTRUCTIONS
Mareos   LO QUE NECESITA SABER:   El mareo es la sensación de falta de equilibrio o inestabilidad  Las causas comunes del mareo son un desequilibrio del líquido del oído interno o la falta de oxígeno en [facility]  Los [employer] ser [employer] (durar 3 días o menos) o crónicos (durar más de 3 días)  Usted puede llegar a tener episodios de [employer] que ribera de segundos a unas horas  INSTRUCCIONES SOBRE EL [facility] HOSPITALARIA:   Regrese a la alexis de emergencias si:   · Usted tiene dolor de [place] y rigidez en el sonny  · Usted tiene escalofríos con temblores y [place]  · Usted vomita pablo y [place] vez sin [place]  · [facility]owell vómito o deposiciones están meier o negras  · Usted tiene dolor en el pecho, espalda o abdomen  · Usted tiene adormecimiento, sobre todo en la teresa, brazos o piernas  · Usted tiene dificultad para  los brazos o las piernas  · Usted está confundido  Pregúntele a mcdowell Reyne Rave vitaminas y minerales son adecuados para usted  · Usted tiene fiebre  · Renate síntomas no mejoran con el tratamiento  · Usted tiene preguntas o inquietudes acerca de [facility] condición o cuidado  [place] de mcdowell síntomas:   · No maneje   u opere maquinaria pesada cuando esté mareado  · Levántese lentamente  cuando esté sentado o acostado  · [employer] suficiente líquidos  Los líquidos ayudan a evitar la deshidratación  Pregunte cuánto líquido debe farnaz cada día y cuáles líquidos son los más adecuados para usted  Acuda a renate consultas de control con [facility] médico según le indicaron  Anote renate preguntas para que se acuerde de hacerlas vern renate visitas  © 2017 [address] Ortega Information is for End User's use only and may not be sold, redistributed or otherwise used for commercial purposes  All illustrations and images included in CareNotes® are the copyrighted property of A D A M , Inc  or Blaise Loomis  Esta información es sólo para uso en educación   [facility] intención no es darle un consejo [facility] o tratamientos  Colsulte con nelson Freeda Mcgrath farmacéutico antes de seguir cualquier régimen médico para saber si es seguro y efectivo para usted

## 2018-11-02 NOTE — ED NOTES
Pt informed that a urine sample is needed  Pt sat up in bed and reports she is too dizzy to ambulate to the restroom at this time        Lisset Michaud RN  11/02/18 2144

## 2018-11-02 NOTE — ED PROCEDURE NOTE
PROCEDURE  ECG 12 Lead Documentation  Date/Time: 11/2/2018 5:41 PM  Performed by: Gabriela Snyder  Authorized by: Gabriela Snyder     Indications / Diagnosis:  Dizzy  ECG reviewed by me, the ED Provider: yes    Patient location:  ED  Interpretation:     Interpretation: normal    Rate:     ECG rate:  69    ECG rate assessment: normal    Rhythm:     Rhythm: sinus rhythm    Ectopy:     Ectopy: none    Conduction:     Conduction: normal    ST segments:     ST segments:  Normal  T waves:     T waves: normal           Rita DO Jennifer  11/02/18 1742

## 2018-11-03 LAB
ATRIAL RATE: 69 BPM
P AXIS: 78 DEGREES
PR INTERVAL: 152 MS
QRS AXIS: 45 DEGREES
QRSD INTERVAL: 92 MS
QT INTERVAL: 382 MS
QTC INTERVAL: 409 MS
T WAVE AXIS: 40 DEGREES
VENTRICULAR RATE: 69 BPM

## 2018-11-03 PROCEDURE — 93010 ELECTROCARDIOGRAM REPORT: CPT | Performed by: INTERNAL MEDICINE

## 2018-11-07 ENCOUNTER — OFFICE VISIT (OUTPATIENT)
Dept: FAMILY MEDICINE CLINIC | Facility: CLINIC | Age: 33
End: 2018-11-07
Payer: COMMERCIAL

## 2018-11-07 VITALS
OXYGEN SATURATION: 98 % | DIASTOLIC BLOOD PRESSURE: 64 MMHG | HEIGHT: 65 IN | WEIGHT: 160 LBS | SYSTOLIC BLOOD PRESSURE: 110 MMHG | BODY MASS INDEX: 26.66 KG/M2 | TEMPERATURE: 97.2 F | RESPIRATION RATE: 16 BRPM | HEART RATE: 95 BPM

## 2018-11-07 DIAGNOSIS — D50.9 IRON DEFICIENCY ANEMIA, UNSPECIFIED IRON DEFICIENCY ANEMIA TYPE: Primary | ICD-10-CM

## 2018-11-07 DIAGNOSIS — R42 DIZZINESS: ICD-10-CM

## 2018-11-07 PROCEDURE — 3725F SCREEN DEPRESSION PERFORMED: CPT | Performed by: PHYSICIAN ASSISTANT

## 2018-11-07 PROCEDURE — 3008F BODY MASS INDEX DOCD: CPT | Performed by: PHYSICIAN ASSISTANT

## 2018-11-07 PROCEDURE — 99214 OFFICE O/P EST MOD 30 MIN: CPT | Performed by: PHYSICIAN ASSISTANT

## 2018-11-07 PROCEDURE — 1036F TOBACCO NON-USER: CPT | Performed by: PHYSICIAN ASSISTANT

## 2018-11-07 RX ORDER — FERROUS SULFATE 325(65) MG
325 TABLET ORAL DAILY
Qty: 30 TABLET | Refills: 5 | Status: SHIPPED | OUTPATIENT
Start: 2018-11-07

## 2018-11-07 NOTE — PATIENT INSTRUCTIONS
Anemia por deficiencia de parul   LO QUE NECESITA SABER:   La anemia por deficiencia de parul consiste en niveles bajos de glóbulos rojos y hemoglobina debido a la falta de parul en la kelley  El parul ayuda a producir hemoglobina  La hemoglobina es parte de renate glóbulos rojos y [place] a transportar el oxígeno a nelson cuerpo  Las causas más comunes son la pérdida de kelley y el no ingerir alimentos que tengan suficiente parul  INSTRUCCIONES SOBRE EL [facility]PITALARIA:   Llame al 911 en nichelle de presentar lo siguiente:   · Usted tiene falta de aliento incluso cuando descansa  Busque atención médica de inmediato si:   · Usted tiene evacuaciones intestinales oscuras o con kelley  · Usted está demasiado mareado para ponerse de pie  · Usted tiene dificultad para tragar debido al dolor en nelson boca y garganta  Pregúntele a nelson [place] vitaminas y minerales son adecuados para usted  · Usted tiene [employer], estreñimiento o diarrea  · Usted tiene náuseas o está vomitando  · Usted está mareado o muy cansado  · Usted tiene preguntas o inquietudes acerca de nelson condición o cuidado  Medicamentos:  Es posible que usted necesite alguno de los siguientes:  · Suplementos de parul o ácido fólico  ayudan a elevar los niveles de hemoglobina y glóbulos rojos  · Los ablandadores de evacuaciones  se podrían necesitar si los suplementos de parul causan estreñimiento  · [place] renate medicamentos francisca se le haya indicado  Consulte con nelson médico si usted [place] que nelson medicamento no le está ayudando o si presenta efectos secundarios  Infórmele si es alérgico a algún medicamento  Mantenga pablo lista actualizada de los [place], las vitaminas y los productos herbales que teena  Incluya los siguientes datos de los medicamentos: cantidad, frecuencia y motivo de administración  Traiga con usted la lista o los envases de la píldoras a renate citas de seguimiento   Lleve la lista de los medicamentos con usted en nichelle de pablo emergencia  Consuma alimentos ricos en parul y proteínas:  Alimentos altos en parul y proteína son por ejemplo los spring secos, petr, verduras de hojas verdes oscuras y frijoles  No tome café, té u otros líquidos que contengan cafeína  Limite el consumo de [place] a 2 tazas al día  Es posible que necesite consultar a un nutricionista para crear un plan de alimentación adecuado para usted  [place] líquidos según renate indicaciones:  Los líquidos [facility] a prevenir el estreñimiento  Pregunte cuánto líquido debe farnaz cada día y cuáles líquidos son los más adecuados para usted  Acuda a renate consultas de control con [facility] médico según le indicaron  Anote renate preguntas para que se acuerde de hacerlas vern renate visitas  © 2017 [address]  Information is for End User's use only and may not be sold, redistributed or otherwise used for commercial purposes  All illustrations and images included in CareNotes® are the copyrighted property of A D A M , Inc  or Blaise Loomis  Esta información es sólo para uso en educación  [facility] intención no es darle un consejo médico sobre enfermedades o tratamientos  Colsulte con mcdowell Oma Antis farmacéutico antes de seguir cualquier régimen médico para saber si es seguro y efectivo para usted

## 2018-11-07 NOTE — PROGRESS NOTES
Assessment/Plan:    Iron deficiency anemia  Will start on iron  This may be reason for dizziness and weakness         Problem List Items Addressed This Visit        Other    Iron deficiency anemia - Primary     Will start on iron  This may be reason for dizziness and weakness         Relevant Medications    ferrous sulfate 325 (65 Fe) mg tablet      Other Visit Diagnoses     Dizziness                Subjective:      Patient ID: Amber Alcaraz is a 29 y o  female  HPI  30 y/o F here for follow up from ED on 11/2 for dizziness  It began suddently and it was a sensation she was going to fall down  No nausea  No ear pain or ringing in the ear  No recent illness  The meclizine did not help  Dizziness is coming and going  She feels weak at times also  No other symtpoms she is having  She was having heavier periods in the past but was taken off of depo and periods are still not normal  They are happening 2 times a month but usuallyfor 2 days  The following portions of the patient's history were reviewed and updated as appropriate:   She  has no past medical history on file  She   Patient Active Problem List    Diagnosis Date Noted    Iron deficiency anemia 11/07/2018    Chronic bilateral low back pain without sciatica 08/28/2018     She  has a past surgical history that includes Cardiac catheterization and BREAST IMPLANT  Her family history includes Diabetes in her father; Hypertension in her father and mother  She  reports that she has never smoked  She has never used smokeless tobacco  She reports that she drinks alcohol  She reports that she does not use drugs    Current Outpatient Prescriptions   Medication Sig Dispense Refill    meclizine (ANTIVERT) 25 mg tablet Take 1 tablet (25 mg total) by mouth every 8 (eight) hours as needed for dizziness 15 tablet 0    ferrous sulfate 325 (65 Fe) mg tablet Take 1 tablet (325 mg total) by mouth daily 30 tablet 5     No current facility-administered medications for this visit  Current Outpatient Prescriptions on File Prior to Visit   Medication Sig    meclizine (ANTIVERT) 25 mg tablet Take 1 tablet (25 mg total) by mouth every 8 (eight) hours as needed for dizziness     No current facility-administered medications on file prior to visit  She has No Known Allergies       Review of Systems   Constitutional: Positive for fatigue  Negative for activity change, appetite change, chills and unexpected weight change  HENT: Negative for dental problem, ear pain, hearing loss and sore throat  Eyes: Negative for visual disturbance  Respiratory: Negative for cough and wheezing  Cardiovascular: Negative for chest pain  Gastrointestinal: Negative for abdominal pain, constipation, diarrhea and vomiting  Genitourinary: Negative for difficulty urinating and dysuria  Musculoskeletal: Negative for arthralgias, back pain and myalgias  Skin: Negative for rash  Neurological: Positive for dizziness  Negative for headaches  Psychiatric/Behavioral: Negative for behavioral problems  Objective:      /64 (BP Location: Left arm, Patient Position: Sitting, Cuff Size: Standard)   Pulse 95   Temp (!) 97 2 °F (36 2 °C) (Tympanic)   Resp 16   Ht 5' 5" (1 651 m)   Wt 72 6 kg (160 lb)   LMP 10/16/2018 (Exact Date) Comment: pt states got her period 2xs october   SpO2 98%   Breastfeeding? No   BMI 26 63 kg/m²          Physical Exam   Constitutional: She is oriented to person, place, and time  She appears well-developed and well-nourished  No distress  HENT:   Head: Normocephalic and atraumatic  Right Ear: External ear normal    Left Ear: External ear normal    Eyes: Conjunctivae are normal    Neck: Normal range of motion  Neck supple  No thyromegaly present  Cardiovascular: Normal rate, regular rhythm and normal heart sounds  No murmur heard  Pulmonary/Chest: Effort normal and breath sounds normal  No respiratory distress  She has no wheezes  Lymphadenopathy:     She has no cervical adenopathy  Neurological: She is alert and oriented to person, place, and time  Psychiatric: She has a normal mood and affect  Her behavior is normal    Nursing note and vitals reviewed

## 2018-12-13 ENCOUNTER — TELEPHONE (OUTPATIENT)
Dept: FAMILY MEDICINE CLINIC | Facility: CLINIC | Age: 33
End: 2018-12-13

## 2018-12-13 NOTE — TELEPHONE ENCOUNTER
----- Message from [address], PA-C sent at 12/13/2018  8:21 AM EST -----  Regarding: fmla  I got a fmla for her and I have no clue why  She was seen in November and we didn't discuss this  She needs an appoointment to discuss it

## 2018-12-13 NOTE — TELEPHONE ENCOUNTER
Pt says it was from the visit in Nov for you  She says she came in to see you for the fup ER visit  Pt has apt with you on 1/8/2018 to discuss the FMLA

## 2018-12-13 NOTE — TELEPHONE ENCOUNTER
----- Message from [address], PA-C sent at 12/13/2018  8:21 AM EST -----  Regarding: fmla  I got a fmla for her and I have no clue why  She was seen in November and we didn't discuss this  She needs an appoointment to discuss it

## 2018-12-20 ENCOUNTER — OFFICE VISIT (OUTPATIENT)
Dept: OBGYN CLINIC | Facility: CLINIC | Age: 33
End: 2018-12-20
Payer: COMMERCIAL

## 2018-12-20 ENCOUNTER — TELEPHONE (OUTPATIENT)
Dept: OBGYN CLINIC | Facility: CLINIC | Age: 33
End: 2018-12-20

## 2018-12-20 VITALS
SYSTOLIC BLOOD PRESSURE: 112 MMHG | BODY MASS INDEX: 27.02 KG/M2 | DIASTOLIC BLOOD PRESSURE: 76 MMHG | HEIGHT: 65 IN | WEIGHT: 162.2 LBS

## 2018-12-20 DIAGNOSIS — Z30.09 COUNSELING FOR BIRTH CONTROL REGARDING INTRAUTERINE DEVICE (IUD): Primary | ICD-10-CM

## 2018-12-20 PROCEDURE — 99213 OFFICE O/P EST LOW 20 MIN: CPT | Performed by: OBSTETRICS & GYNECOLOGY

## 2018-12-20 NOTE — PROGRESS NOTES
Assessment     29 y o , starting IUD, no contraindications  There are no diagnoses linked to this encounter  Counseling / Coordination of Care  Total time spent today15 minutes  minutes  Greater than 50% of total time was spent with the patient and / or family counseling and / or coordination of care  Plan     All questions answered  Contraception: IUD  Discussed with patient birth control options including the pill, patch, ring and Depo-Provera  Also discussed long-acting reversible contraception including Mirena Intrauterine device versus copper Intrauterine device versus Nexplanon  Patient continues to desire copper Intrauterine device despite having had untoward side effects of abnormal uterine bleeding  I recommended a Mirena Intrauterine device to help with heavy menses but she declines this  She had gonorrhea and chlamydia testing that was negative previously  She will return to the office for Intrauterine device insertion of Mirena IUD  We will check benefits again and call patient with this  Subjective      Nurys Mckeon is a 29 y o  female who presents for contraception counseling  The patient has no complaints today  The patient is sexually active  Pertinent past medical history: none  Patient had previously a ParaGard Intrauterine device placed in   This was removed in 2018 due to abnormal uterine bleeding  She is using condoms for birth control  She declines permanent sterilization  Menstrual History:  OB History      Para Term  AB Living    3 2 2 0 1 2    SAB TAB Ectopic Multiple Live Births    1 0 0 0 1           Patient's last menstrual period was 2018         The following portions of the patient's history were reviewed and updated as appropriate: allergies, current medications, past family history, past medical history, past social history, past surgical history and problem list     Review of Systems   Constitutional: Negative  HENT: Negative  Eyes: Negative  Respiratory: Negative  Cardiovascular: Negative  Gastrointestinal: Negative  Endocrine: Negative  Genitourinary:        As noted in HPI   Musculoskeletal: Negative  Skin: Negative  Allergic/Immunologic: Negative  Neurological: Negative  Hematological: Negative  Psychiatric/Behavioral: Negative  Physical Exam   Constitutional: She is oriented to person, place, and time  Vital signs are normal  She appears well-developed and well-nourished  No distress  Neurological: She is alert and oriented to person, place, and time  Skin: Skin is warm and dry  Psychiatric: She has a normal mood and affect  Her behavior is normal        The remainder of her physical examination was deferred as she was here today for consultation and discussion

## 2018-12-20 NOTE — TELEPHONE ENCOUNTER
This patient is covered 100% with no co-pay   Spoke with [facility] 12/20/18 @ [phone]

## 2019-01-08 ENCOUNTER — TELEPHONE (OUTPATIENT)
Dept: OBGYN CLINIC | Facility: CLINIC | Age: 34
End: 2019-01-08

## 2019-01-08 ENCOUNTER — OFFICE VISIT (OUTPATIENT)
Dept: FAMILY MEDICINE CLINIC | Facility: CLINIC | Age: 34
End: 2019-01-08

## 2019-01-08 VITALS
DIASTOLIC BLOOD PRESSURE: 70 MMHG | OXYGEN SATURATION: 100 % | WEIGHT: 164.13 LBS | HEART RATE: 73 BPM | HEIGHT: 65 IN | TEMPERATURE: 97.8 F | RESPIRATION RATE: 18 BRPM | BODY MASS INDEX: 27.35 KG/M2 | SYSTOLIC BLOOD PRESSURE: 112 MMHG

## 2019-01-08 DIAGNOSIS — Z32.01 POSITIVE PREGNANCY TEST: Primary | ICD-10-CM

## 2019-01-08 PROCEDURE — 99913: CPT | Performed by: PHYSICIAN ASSISTANT

## 2019-01-08 PROCEDURE — 99213 OFFICE O/P EST LOW 20 MIN: CPT | Performed by: PHYSICIAN ASSISTANT

## 2019-01-08 RX ORDER — CALCIUM CARBONATE 300MG(750)
2 TABLET,CHEWABLE ORAL ONCE
Qty: 90 TABLET | Refills: 2 | Status: SHIPPED | OUTPATIENT
Start: 2019-01-08 | End: 2019-01-08

## 2019-01-08 NOTE — TELEPHONE ENCOUNTER
----- Message from Ace Kurtz MD sent at 12/20/2018  2:27 PM EST -----  Regarding:   Mirena    Please check Mirena Intrauterine device benefits  She will have this inserted after her next period      Pls disregard previous message  She actually want the Mirena

## 2019-01-08 NOTE — PATIENT INSTRUCTIONS
Embarazo de la semana 7 a la 10   LO QUE NECESITA SABER:   ¿Qué cambios están ocurriendo en mi cuerpo? La hormonas del embarazo podrían provocar que nelson cuerpo pase por varios cambios vern esta etapa del embarazo  Usted podría tener cualquiera de los siguientes signos y síntomas:  · Cansancio    · Senos sensibles, inflamados    · Náuseas o vómitos (malestar matutino)    · Oscilaciones en el estado de ánimo    · Orina con frecuencia     · Dolor de rishabh    · Acidez o estreñimiento    · Aumento o perdida de peso    · Antojos de ciertos alimentos o desagrado a ciertos alimentos que usted come normalmente  ¿Cómo me maia cuidar en esta etapa de mi embarazo? · Controle la náusea y el vómito  Evite los alimentos grasosos y picantes  Coma comidas pequeñas vern el día en vez de porciones grandes  El jengibre puede ayudar a [employer]  Consulte con nelson médico acerca de otras formas para disminuir las náuseas y el vómito  · Consuma alimentos saludables y variados  Alimentos saludables incluyen frutas, verduras, panes de diane integral, alimentos lácteos bajos en grasa, frijoles, petr magras y pescado  [place] líquidos francisca se le haya indicado  Pregunte cuánto líquido debe farnaz cada día y cuáles líquidos son los más adecuados para usted  Limite el consumo de cafeína a menos de [address]  Limite el consumo de pescado a 2 porciones cada semana  Escoja pescado con concentraciones bajas de trey francisca atún al natural enlatado, camarón, salmón, bacalao o tilapia  No  coma pescado con concentraciones altas de trey francisca pez kristine, caballa gigante, pargo rayado y jodie  · [address] [place]  Nelson necesidad de ciertas vitaminas y [address] [facility], francisca el ácido fólico, aumenta vern el [facility]  Las vitaminas prenatales proporcionan algunas de las vitaminas y minerales adicionales que usted necesita   Las vitaminas prenatales también podrían ayudar a disminuir el riesgo de ciertos defectos de nacimiento  · Pregunte cuánto peso usted debería aumentar cada mes  Demasiado aumento de peso o muy poco puede ser poco saludable para usted y nelson bebé  · No fume  Si usted fuma, nunca es demasiado tarde para dejar de hacerlo  Fumar aumenta el riesgo de aborto espontáneo y otros problemas de kareen vern nelson [place]  Fumar puede causar que nelson bebé nazca antes de tiempo o que pese menos al nacer  Solicite información a nelson médico si usted necesita ayuda para dejar de fumar  · No consuma alcohol  El alcohol pasa de nelson cuerpo al bebé a través de la placenta  Puede afectar el desarrollo del cerebro de nelson bebé y provocar el síndrome de alcoholismo fetal (SAF)  FAS es un danyell de condiciones que causan [address], de comportamiento y de crecimiento  · Consulte con nelson médico antes de farnaz cualquier medicamento  Muchos medicamentos pueden perjudicar a nelson bebé si usted los teena [facility] [address]  No tome ningún medicamento, vitaminas, hierbas o suplementos sin zenaida consultar con nelson GregStateline Georgian  use drogas ilegales o de la baker (francisca marihuana o cocaína) mientras está embarazada  ¿Cuáles son Vanessa Pouch consejos de seguridad vern el embarazo? · Evite jacuzzis y saunas  No use un jacuzzi o un sauna mientras usted está embarazada, especialmente vern el primer trimestre  Los [facility] y los saunas aumentan la temperatura de nelson bebé y el riesgo de defectos de nacimiento  · Evite la toxoplasmosis  [place] es pablo infección causada por comer carne cruda o estar cerca del excremento de un beverly infectado  [place] puede causar malformaciones congénitas, aborto espontáneo y Marco Schein  Lávese las jaye después de tocar carne cruda  Asegúrese de que la carne esté alejandra cocida antes de comerla  Evite los huevos crudos y la [place]  Use guantes o pida que alguien la ayude a limpiar la caja de arena del beverly mientras usted [place]    ¿Qué cambios están ocurriendo con mi bebé? Para las 10 semanas, nelson bebé medirá alrededor de 2 ½ pulgadas desde la krystyna hasta la rabadilla (parte inferior o cóccix del bebé)  Nelson bebé pesa alrededor de ½ onza  Los [employer] del cuerpo, francisca el cerebro, corazón y pulmones se están formando  Las características faciales de nelson bebé también están comenzando a formarse  ¿Qué necesito saber acerca del cuidado prenatal?  El cuidado prenatal se trata de pablo serie de visitas con nelson médico a lo mingo del embarazo  Vern las primeras 28 semanas de nelson Maximiliano, usted tendrá citas mensuales con nelson médico  El cuidado prenatal puede ayudar a evitar problemas vern el [facility] y [place]  Nelson médico le hará preguntas acerca de nelson kareen y de cualquier embarazo previo que usted haya tenido  Él también le preguntará acerca de cualquier medicamento que esté tomando  Es posible que también necesite alguno de los siguientes tratamientos:  · Pablo prueba de [place]  se realiza para revisar si nelson sonny uterino tiene células anormales  El sonny uterino es la apertura angosta que está en la parte inferior de [place]  El sonny uterino se junta con la parte superior de la vagina  · Un examen pélvico  le permite a nelson médico observar nelson sonny uterino (la parte inferior de nelson [place])  Nelson médico utiliza un espéculo para abrir nelson vagina suavemente  Él examinará el tamaño y forma de nelson útero  · Los análisis de kelley:  podrían realizarse para revisar signos de anemia o el tipo de [place]  Nelson médico también podría ordenarle otros exámenes de kelley para revisar si usted es inmune a ciertas enfermedades francisca la Hepatitis B  También podría recomendarle un examen del VIH  · Análisis de orina  también podrían realizarse para revisar si hay signos de infección  · Nelson presión arterial y peso  será revisado  ¿Cuándo maia buscar atención inmediata?    · Usted tiene dolor o cólicos en el abdomen o la parte baja de la espalda  · Usted tiene sangrado vaginal abundante o coágulos  · Le sale un material que parece tejido o coágulos grandes  Recolecte el material y tráigalo con usted  ¿Cuándo maia comunicarme con mi médico?   · Usted tiene un sangrado leve  · Usted tiene escalofríos o fiebre  · Usted tiene comezón, ardor o dolor vaginal      · Usted tiene pablo secreción vaginal amarillenta, verdosa, qing o de [employer]  · Usted tiene dolor o ardor al [place], orina menos de lo habitual o tiene [place] rosada o sanguinolenta  · Usted tiene preguntas o inquietudes acerca de perez condición o cuidado  ACUERDOS SOBRE PEREZ CUIDADO:   Usted tiene el derecho de ayudar a planear perez cuidado  Aprenda todo lo que pueda sobre perez condición y francisca darle tratamiento  Discuta renate opciones de tratamiento con renate médicos para decidir el cuidado que usted desea recibir  Usted siempre tiene el derecho de rechazar el tratamiento  Esta información es sólo para uso en educación  Perez intención no es darle un consejo médico sobre enfermedades o tratamientos  Colsulte con perez Arely Davies farmacéutico antes de seguir cualquier régimen médico para saber si es seguro y efectivo para usted  © 2017 [address]  Information is for End User's use only and may not be sold, redistributed or otherwise used for commercial purposes  All illustrations and images included in CareNotes® are the copyrighted property of [employer] , Inc  or Blaise Loomis

## 2019-01-08 NOTE — PROGRESS NOTES
Assessment/Plan:    No problem-specific Assessment & Plan notes found for this encounter  Problem List Items Addressed This Visit     None      Visit Diagnoses     Positive pregnancy test    -  Primary    Relevant Medications    Prenatal MV-Min-FA-Omega-3 (PRENATAL GUMMIES/DHA & FA) 0 4-32 5 MG CHEW        She will call ob/gyn today to discuss pregnancy    Subjective:      Patient ID: Fausto Guzman is a 29 y o  female  HPI 30 y/o F here for form for FMLA because she missed one day on 11/2 when she was in the ED for dizziness  She was found to have a mild anemia and is on iron and dizziness is improved  She is 20 days late for her period  She is not currently on any birth control  The following portions of the patient's history were reviewed and updated as appropriate:   She  has no past medical history on file  She   Patient Active Problem List    Diagnosis Date Noted    Iron deficiency anemia 11/07/2018    Chronic bilateral low back pain without sciatica 08/28/2018     She  has a past surgical history that includes Cardiac catheterization and BREAST IMPLANT  Her family history includes Diabetes in her father; Hypertension in her father and mother  She  reports that she has never smoked  She has never used smokeless tobacco  She reports that she does not drink alcohol or use drugs  Current Outpatient Prescriptions   Medication Sig Dispense Refill    ferrous sulfate 325 (65 Fe) mg tablet Take 1 tablet (325 mg total) by mouth daily 30 tablet 5    Prenatal MV-Min-FA-Omega-3 (PRENATAL GUMMIES/DHA & FA) 0 4-32 5 MG CHEW Chew 2 tablets once for 1 dose 90 tablet 2     No current facility-administered medications for this visit        Current Outpatient Prescriptions on File Prior to Visit   Medication Sig    ferrous sulfate 325 (65 Fe) mg tablet Take 1 tablet (325 mg total) by mouth daily    [DISCONTINUED] meclizine (ANTIVERT) 25 mg tablet Take 1 tablet (25 mg total) by mouth every 8 (eight) hours as needed for dizziness (Patient not taking: Reported on 1/8/2019 )     No current facility-administered medications on file prior to visit  She has No Known Allergies       Review of Systems   Constitutional: Negative for activity change, appetite change, chills, fatigue and unexpected weight change  HENT: Negative for dental problem, ear pain, hearing loss and sore throat  Eyes: Negative for visual disturbance  Respiratory: Negative for cough and wheezing  Cardiovascular: Negative for chest pain  Gastrointestinal: Negative for abdominal pain, constipation, diarrhea and vomiting  Genitourinary: Negative for difficulty urinating and dysuria  Musculoskeletal: Negative for arthralgias, back pain and myalgias  Skin: Negative for rash  Neurological: Positive for dizziness (mild dizziness)  Negative for headaches  Psychiatric/Behavioral: Negative for behavioral problems  Objective:      /70 (BP Location: Right arm, Patient Position: Sitting, Cuff Size: Standard)   Pulse 73   Temp 97 8 °F (36 6 °C) (Tympanic)   Resp 18   Ht 5' 5" (1 651 m)   Wt 74 4 kg (164 lb 2 oz)   LMP 11/20/2018 (Approximate)   SpO2 100%   BMI 27 31 kg/m²          Physical Exam   Constitutional: She is oriented to person, place, and time  She appears well-developed and well-nourished  No distress  HENT:   Head: Normocephalic and atraumatic  Right Ear: External ear normal    Left Ear: External ear normal    Eyes: Conjunctivae are normal    Neck: Normal range of motion  Neck supple  No thyromegaly present  Cardiovascular: Normal rate, regular rhythm and normal heart sounds  No murmur heard  Pulmonary/Chest: Effort normal and breath sounds normal  No respiratory distress  She has no wheezes  Lymphadenopathy:     She has no cervical adenopathy  Neurological: She is alert and oriented to person, place, and time  Psychiatric: She has a normal mood and affect   Her behavior is normal    Nursing note and vitals reviewed

## 2019-01-22 ENCOUNTER — OFFICE VISIT (OUTPATIENT)
Dept: OBGYN CLINIC | Facility: CLINIC | Age: 34
End: 2019-01-22
Payer: COMMERCIAL

## 2019-01-22 VITALS
SYSTOLIC BLOOD PRESSURE: 118 MMHG | WEIGHT: 163 LBS | HEIGHT: 65 IN | DIASTOLIC BLOOD PRESSURE: 62 MMHG | BODY MASS INDEX: 27.16 KG/M2

## 2019-01-22 DIAGNOSIS — N91.2 AMENORRHEA: ICD-10-CM

## 2019-01-22 DIAGNOSIS — O21.9 NAUSEA/VOMITING IN PREGNANCY: ICD-10-CM

## 2019-01-22 DIAGNOSIS — Z3A.08 8 WEEKS GESTATION OF PREGNANCY: ICD-10-CM

## 2019-01-22 DIAGNOSIS — Z36.9 ANTENATAL SCREENING ENCOUNTER: Primary | ICD-10-CM

## 2019-01-22 LAB — SL AMB POCT URINE HCG: POSITIVE

## 2019-01-22 PROCEDURE — 81025 URINE PREGNANCY TEST: CPT | Performed by: OBSTETRICS & GYNECOLOGY

## 2019-01-22 PROCEDURE — 99214 OFFICE O/P EST MOD 30 MIN: CPT | Performed by: OBSTETRICS & GYNECOLOGY

## 2019-01-22 RX ORDER — VITAMIN C, CALCIUM, IRON, VITAMIN D3, VITAMIN E, VITAMIN B1, VITAMIN B2, VITAMIN B3, VITAMIN B6, FOLIC ACID, IODINE, ZINC, COPPER, DOCUSATE SODIUM, DOCOSAHEXAENOIC ACID (DHA) 27-1-50 MG
1 KIT ORAL DAILY
Qty: 30 EACH | Refills: 6 | Status: SHIPPED | OUTPATIENT
Start: 2019-01-22

## 2019-01-22 RX ORDER — METOCLOPRAMIDE 10 MG/1
10 TABLET ORAL EVERY 6 HOURS PRN
Qty: 30 TABLET | Refills: 3 | Status: SHIPPED | OUTPATIENT
Start: 2019-01-22

## 2019-01-22 NOTE — PROGRESS NOTES
Assessment/Plan     Diagnoses and all orders for this visit:    Amenorrhea  -     POCT urine HCG  -     Prenat w/o A-FeCbGl-DSS-FA-DHA (PNV OB+DHA) 27-1 & 250 MG MISC; Take 1 tablet by mouth daily  -     US OB < 14 weeks with transvaginal; Future    8 weeks gestation of pregnancy  -     US OB < 14 weeks with transvaginal; Future          Patient had a pelvic transvaginal ultrasound in the office today  This shows a viable intrauterine pregnancy at 6 weeks and 6/7 days by crown-rump length  Fetal heart rate was noted at 135 beats per minute  SAVANNAH based on this ultrasound is September 11, 2019  Patient was asked to return to the office in another 2 weeks for repeat dating ultrasound and viability scan  She was advised to take Tylenol as needed for pain  She was asked to call if with severe pain and or vaginal bleeding  Prenatal vitamin was prescribed  She was also given Reglan as needed for pain  Patient was provided with pregnancy education  She was instructed to eat healthy balanced diet  She was instructed to take prenatal vitamins  She was instructed to avoid raw food and avoid certain medications in pregnancy  She was asked to call if with pregnancy questions or concerns  She was asked to call if with intractable nausea or vomiting, severe abdominal pain or vaginal bleeding  Warning signs were discussed with patient  Return in 2 weeks for dating US, OD intake and OB initial PE  Subjective   Kari Handley is an 29 y o  woman who presents for pregnancy confirmation  Patient is here for a pregnancy confirmation  Her LMP was on 11/23/18   8 4/7 weeks  EDC: 8/30/19  She had IUD removed on 9/11/18  She has some pelvic pain occasionally and cramping  She has some vaginal irritation after intercourse  She denies vaginal bleeding  She has nausea and occasional vomiting  She is taking prenatal vitamins  Her first positive pregnancy test was sometime      Her periods are regular, occurring every 28 days  OB History      Para Term  AB Living    4 2 2 0 1 2    SAB TAB Ectopic Multiple Live Births    1 0 0 0 1          History reviewed  No pertinent past medical history  Past Surgical History:   Procedure Laterality Date    BREAST IMPLANT      CARDIAC CATHETERIZATION         Social History     Social History    Marital status: Single     Spouse name: N/A    Number of children: N/A    Years of education: N/A     Occupational History    Not on file  Social History Main Topics    Smoking status: Never Smoker    Smokeless tobacco: Never Used    Alcohol use No    Drug use: No    Sexual activity: Not Currently     Partners: Male      Comment: 10/2014 PARAGARD IUD     Other Topics Concern    Not on file     Social History Narrative    No narrative on file       No Known Allergies      Current Outpatient Prescriptions:     ferrous sulfate 325 (65 Fe) mg tablet, Take 1 tablet (325 mg total) by mouth daily, Disp: 30 tablet, Rfl: 5    Prenat w/o A-FeCbGl-DSS-FA-DHA (PNV OB+DHA) 27-1 & 250 MG MISC, Take 1 tablet by mouth daily, Disp: 30 each, Rfl: 6    The following portions of the patient's history were reviewed and updated as appropriate: allergies, current medications, past family history, past medical history, past social history, past surgical history and problem list       Review of Systems   Constitutional: Negative  HENT: Negative  Eyes: Negative  Respiratory: Negative  Cardiovascular: Negative  Gastrointestinal: Negative  Endocrine: Negative  Genitourinary:        As noted in HPI   Musculoskeletal: Negative  Skin: Negative  Allergic/Immunologic: Negative  Neurological: Negative  Hematological: Negative  Psychiatric/Behavioral: Negative  Physical Exam   Constitutional: She is oriented to person, place, and time  She appears well-developed and well-nourished  No distress     Genitourinary: Uterus normal  Pelvic exam was performed with patient supine  There is no rash, tenderness, lesion or injury on the right labia  There is no rash, tenderness, lesion or injury on the left labia  No erythema, tenderness or bleeding in the vagina  No signs of injury around the vagina  No vaginal discharge found  Right adnexum does not display mass, does not display tenderness and does not display fullness  Left adnexum does not display mass, does not display tenderness and does not display fullness  Cervix does not exhibit motion tenderness, lesion, discharge or polyp  Uterus is not enlarged or tender  Abdominal: Soft  She exhibits no distension  There is no tenderness  Neurological: She is alert and oriented to person, place, and time  Skin: Skin is warm and dry  Psychiatric: She has a normal mood and affect  Her behavior is normal        Counseling / Coordination of Care  Total time spent today30 minutes  minutes  Greater than 50% of total time was spent with the patient and / or family counseling and / or coordination of care

## 2019-01-22 NOTE — PATIENT INSTRUCTIONS
Nausea and Vomiting in Pregnancy   WHAT YOU NEED TO KNOW:   What do I need to know about nausea and vomiting in pregnancy? Nausea and vomiting can happen any time of day  These symptoms usually start before the 9th week of pregnancy, and end by the 14th week (second trimester)  Some women can have nausea and vomiting for a longer time  These symptoms can affect some women throughout the entire pregnancy  Nausea and vomiting do not harm your baby  These symptoms can make it hard for you to do your daily activities  What causes or increases my risk for nausea and vomiting in pregnancy? The cause of nausea and vomiting in pregnancy is not known  Pregnancy causes changes in your hormones that may lead to nausea and vomiting  The following may increase your risk of nausea and vomiting:  · Being pregnant with more than one baby    · Nausea and vomiting in a past pregnancy    · Having a sister or mother who had nausea and vomiting during pregnancy    · History of migraine headaches or motion sickness    · Being pregnant with a female baby  How is nausea and vomiting in pregnancy treated? Treatment for nausea and vomiting in pregnancy is usually not needed  You can make changes in the foods you eat and in your activities to help manage your symptoms  You may need to try several things to learn what works for you  Talk to your healthcare provider if your symptoms do not decrease with the changes suggested below  You may need vitamin B6 and medicine if these changes do not help, or your symptoms become severe  What nutrition changes can I make to manage nausea and vomiting? · Eat small meals throughout the day instead of 3 large meals  You may be more likely to have nausea and vomiting when your stomach is empty  Eat foods that are low in fat and high in protein  Examples are lean meat, beans, turkey, and chicken without the skin   Eat a small snack, such as crackers, dry cereal, or a small sandwich before you go to bed     · Eat some crackers or dry toast before you get out of bed in the morning  Get out of bed slowly  Sudden movements could cause you to get dizzy and nauseated  · Eat bland foods when you feel nauseated  Examples of bland foods include dry toast, dry cereal, plain pasta, white rice, and bread  Other bland foods include saltine crackers, bananas, gelatin, and pretzels  Avoid spicy, greasy, and fried foods  Avoid any other foods that make you feel nauseated  · Drink liquids that contain ginger  Drink ginger ale made with real ginger or ginger tea made with fresh grated ginger  Ginger capsules or ginger candies may also help to decrease nausea and vomiting  · Drink liquids between meals instead of with meals  Wait at least 30 minutes after you eat to drink liquids  Drink small amounts of liquids often throughout the day to prevent dehydration  Ask how much liquid you should drink each day  What other changes can I make to manage nausea and vomiting? · Avoid smells that bother you  Strong odors may cause nausea and vomiting to start, or make it worse  Take a short walk, turn on a fan, or try to sleep with the window open to get fresh air  When you are cooking, open windows to get rid of smells that may cause nausea  · Do not brush your teeth right after you eat  if it makes you nauseated  · Rest when you need to  Start activity slowly and work up to your usual routine as you start to feel better  · Talk to your healthcare provider about your prenatal vitamins  Prenatal vitamins can cause nausea for some women  Try taking your prenatal vitamin at night or with a snack  If this change does not help, your healthcare provider may recommend a different type of vitamin  · Do not use any medicines, vitamins, or supplements to manage your symptoms without asking your healthcare provider  Many medicines can harm an unborn baby       · Light to moderate exercise  may help to decrease your symptoms  It may also help you to sleep better at night  Ask your healthcare provider about the best exercise plan for you  When should I seek immediate care? · You have signs of dehydration  Examples are dark yellow urine, dry mouth and lips, dry skin, fast heartbeat, and urinating less than usual     · You have severe abdominal pain  · You feel too weak or dizzy to stand up  · You see blood in your vomit or bowel movements  When should I contact my healthcare provider? · You vomit more than 4 times in 1 day  · You have not been able to keep liquids down for more than 1 day  · You lose more than 2 pounds  · You have a fever  · Your nausea and vomiting continue longer than 14 weeks  · You have questions or concerns about your condition or care  CARE AGREEMENT:   You have the right to help plan your care  Learn about your health condition and how it may be treated  Discuss treatment options with your caregivers to decide what care you want to receive  You always have the right to refuse treatment  The above information is an  only  It is not intended as medical advice for individual conditions or treatments  Talk to your doctor, nurse or pharmacist before following any medical regimen to see if it is safe and effective for you  © 2017 [employer]  Information is for End User's use only and may not be sold, redistributed or otherwise used for commercial purposes  All illustrations and images included in CareNotes® are the copyrighted property of [employer]  or [employer]oom[employer]  Pregnancy   WHAT YOU NEED TO KNOW:   What do I need to know about pregnancy? A normal pregnancy lasts about 40 weeks  The first trimester lasts from your last period through the 12th week of pregnancy  The second trimester lasts from the 13th week of your pregnancy through the 23rd week  The third trimester lasts from your 24th week of pregnancy until your baby is born  If you know the date of your last period, your healthcare provider can estimate your due date  You may give birth to your baby any time from 37 weeks to 2 weeks after your due date  What is prenatal care? Prenatal care is a series of visits with your healthcare provider throughout your pregnancy  Prenatal care can help prevent problems during pregnancy and childbirth  At each prenatal visit, your healthcare provider will weigh you and check your blood pressure  Your healthcare provider will also check your baby's heartbeat and growth  You may also need the following at some visits:  · A pelvic exam  allows your healthcare provider to see your cervix (the bottom part of your uterus)  Your healthcare provider uses a speculum to gently open your vagina  He will check the size and shape of your uterus  · Blood tests  may be done to check for gestational diabetes and anemia (low iron level)  You may need other blood tests, such as blood type, Rh factor, or tests to check for birth defects  · A fetal ultrasound  shows pictures of your baby inside your uterus  It shows your baby's development  The movement and position of your baby can also be seen  Your healthcare provider may be able to tell you what your baby's gender is during the ultrasound  What can I do to have a healthy pregnancy? · Eat a variety of healthy foods  Healthy foods include fruits, vegetables, whole-grain breads, low-fat dairy foods, beans, lean meats, and fish  Drink liquids as directed  Ask how much liquid to drink each day and which liquids are best for you  Limit caffeine to less than 200 milligrams each day  Limit your intake of fish to 2 servings each week  Choose fish low in mercury such as canned light tuna, shrimp, crab, salmon, cod, or tilapia  Do not  eat fish high in mercury such as swordfish, tilefish, arik mackerel, and shark  · Take prenatal vitamins as directed    Your need for certain vitamins and minerals, such as folic acid, increases during pregnancy  Prenatal vitamins provide some of the extra vitamins and minerals you need  Prenatal vitamins may also help to decrease the risk of certain birth defects  · Ask how much weight you should gain during your pregnancy  Too much or too little weight gain can be unhealthy for you and your baby  · Talk to your healthcare provider about exercise  Moderate exercise can help you stay fit  Your healthcare provider will help you plan an exercise program that is safe for you during pregnancy  · Do not smoke  If you smoke, it is never too late to quit  Smoking increases your risk of a miscarriage and other health problems during your pregnancy  Smoking can cause your baby to be born too early or weigh less at birth  Ask your healthcare provider for information if you need help quitting  · Do not drink alcohol  Alcohol passes from your body to your baby through the placenta  It can affect your baby's brain development and cause fetal alcohol syndrome (FAS)  FAS is a group of conditions that causes mental, behavior, and growth problems  · Talk to your healthcare provider before you take any medicines  Many medicines may harm your baby if you take them when you are pregnant  Do not take any medicines, vitamins, herbs, or supplements without first talking to your healthcare provider  Never use illegal or street drugs (such as marijuana or cocaine) while you are pregnant  What body changes may happen during my pregnancy? · Breast changes  you will experience include tenderness and tingling during the early part of your pregnancy  Your breasts will become larger  You may need to use a support bra  You may see a thin, yellow fluid, called colostrum, leak from your nipples during the second trimester  Colostrum is a liquid that changes to milk about 3 days after you give birth  · Skin changes and stretch marks  may occur during your pregnancy   You may have red marks, called stretch marks, on your skin  Stretch marks will usually fade after pregnancy  Use lotion if your skin is dry and itchy  The skin on your face, around your nipples, and below your belly button may darken  Most of the time, your skin will return to its normal color after your baby is born  · Morning sickness  is nausea and vomiting that can happen at any time of day  Avoid fatty and spicy foods  Eat small meals throughout the day instead of large meals  Ruth may help to decrease nausea  Ask your healthcare provider about other ways of decreasing nausea and vomiting  · Heartburn  may be caused by changes in your hormones during pregnancy  Your growing uterus may also push your stomach upward and force stomach acid to back up into your esophagus  Eat 4 or 5 small meals each day instead of large meals  Avoid spicy foods  Avoid eating right before bedtime  · Constipation  may develop during your pregnancy  To treat constipation, eat foods high in fiber such as fiber cereals, beans, fruits, vegetables, whole-grain breads, and prune juice  Get regular exercise and drink plenty of water  Your healthcare provider may also suggest a fiber supplement to soften your bowel movements  Talk to your healthcare provider before you use any medicines to decrease constipation  · Hemorrhoids  are enlarged veins in the rectal area  They may cause pain, itching, and bright red bleeding from your rectum  To decrease your risk of hemorrhoids, prevent constipation and do not strain to have a bowel movement  If you have hemorrhoids, soak in a tub of warm water to ease discomfort  Ask your healthcare provider how you can treat hemorrhoids  · Leg cramps and swelling  may be caused by low calcium levels or the added weight of pregnancy  Raise your legs above the level of your heart to decrease swelling  During a leg cramp, stretch or massage the muscle that has the cramp  Heat may help decrease pain and muscle spasms   Apply heat on your muscle for 20 to 30 minutes every 2 hours for as many days as directed  · Back pain  may occur as your baby grows  Do not stand for long periods of time or lift heavy items  Use good posture while you stand, squat, or bend  Wear low-heeled shoes with good support  Rest may also help to relieve back pain  Ask your healthcare provider about exercises you can do to strengthen your back muscles  What are some safety tips during pregnancy? · Avoid hot tubs and saunas  Do not use a hot tub or sauna while you are pregnant, especially during your first trimester  Hot tubs and saunas may raise your baby's temperature and increase the risk of birth defects  · Avoid toxoplasmosis  This is an infection caused by eating raw meat or being around infected cat feces  It can cause birth defects, miscarriages, and other problems  Wash your hands after you touch raw meat  Make sure any meat is well-cooked before you eat it  Avoid raw eggs and unpasteurized milk  Use gloves or ask someone else to clean your cat's litter box while you are pregnant  · Ask your healthcare provider about travel  The most comfortable time to travel is during the second trimester  Ask your healthcare provider if you can travel after 36 weeks  You may not be able to travel in an airplane after 36 weeks  He may also recommend that you avoid long road trips  When should I seek immediate care? · You develop a severe headache that does not go away  · You have new or increased vision changes, such as blurred or spotted vision  · You have new or increased swelling in your face or hands  · You have pain or cramping in your abdomen or low back  · You have vaginal bleeding  When should I contact my healthcare provider? · You have abdominal cramps, pressure, or tightening  · You have a change in vaginal discharge  · You cannot keep food or drinks down, and you are losing weight      · You have chills or a fever     · You have vaginal itching, burning, or pain  · You have yellow, green, white, or foul-smelling vaginal discharge  · You have pain or burning when you urinate, less urine than usual, or pink or bloody urine  · You have questions or concerns about your condition or care  CARE AGREEMENT:   You have the right to help plan your care  Learn about your health condition and how it may be treated  Discuss treatment options with your caregivers to decide what care you want to receive  You always have the right to refuse treatment  The above information is an  only  It is not intended as medical advice for individual conditions or treatments  Talk to your doctor, nurse or pharmacist before following any medical regimen to see if it is safe and effective for you  © 2017 [employer]  Information is for End User's use only and may not be sold, redistributed or otherwise used for commercial purposes  All illustrations and images included in CareNotes® are the copyrighted property of [employer]  or Blaise Loomis

## 2021-08-03 ENCOUNTER — TELEPHONE (OUTPATIENT)
Dept: FAMILY MEDICINE CLINIC | Facility: CLINIC | Age: 36
End: 2021-08-03

## 2021-08-03 NOTE — TELEPHONE ENCOUNTER
Alma Delia Fajardo had called and left message on nurse line to schedule appt but she had called back and someone took care of the appt

## 2021-08-19 ENCOUNTER — OFFICE VISIT (OUTPATIENT)
Dept: FAMILY MEDICINE CLINIC | Facility: CLINIC | Age: 36
End: 2021-08-19

## 2021-08-19 VITALS
TEMPERATURE: 97.8 F | RESPIRATION RATE: 18 BRPM | SYSTOLIC BLOOD PRESSURE: 102 MMHG | HEIGHT: 65 IN | BODY MASS INDEX: 27.97 KG/M2 | HEART RATE: 70 BPM | DIASTOLIC BLOOD PRESSURE: 68 MMHG | WEIGHT: 167.9 LBS | OXYGEN SATURATION: 96 %

## 2021-08-19 DIAGNOSIS — G89.29 CHRONIC BILATERAL LOW BACK PAIN WITHOUT SCIATICA: ICD-10-CM

## 2021-08-19 DIAGNOSIS — E66.3 OVERWEIGHT (BMI 25.0-29.9): ICD-10-CM

## 2021-08-19 DIAGNOSIS — Z00.00 ANNUAL PHYSICAL EXAM: Primary | ICD-10-CM

## 2021-08-19 DIAGNOSIS — M54.41 ACUTE BILATERAL LOW BACK PAIN WITH RIGHT-SIDED SCIATICA: ICD-10-CM

## 2021-08-19 DIAGNOSIS — R53.83 FATIGUE, UNSPECIFIED TYPE: ICD-10-CM

## 2021-08-19 DIAGNOSIS — M54.50 CHRONIC BILATERAL LOW BACK PAIN WITHOUT SCIATICA: ICD-10-CM

## 2021-08-19 DIAGNOSIS — E55.9 VITAMIN D DEFICIENCY: ICD-10-CM

## 2021-08-19 DIAGNOSIS — Z11.59 NEED FOR HEPATITIS C SCREENING TEST: ICD-10-CM

## 2021-08-19 DIAGNOSIS — I83.893 VARICOSE VEINS OF BOTH LEGS WITH EDEMA: ICD-10-CM

## 2021-08-19 DIAGNOSIS — Z11.4 SCREENING FOR HIV (HUMAN IMMUNODEFICIENCY VIRUS): ICD-10-CM

## 2021-08-19 PROCEDURE — 99385 PREV VISIT NEW AGE 18-39: CPT | Performed by: FAMILY MEDICINE

## 2021-08-19 RX ORDER — CYCLOBENZAPRINE HCL 5 MG
5 TABLET ORAL 3 TIMES DAILY PRN
Qty: 90 TABLET | Refills: 0 | Status: SHIPPED | OUTPATIENT
Start: 2021-08-19 | End: 2021-10-07

## 2021-08-19 NOTE — ASSESSMENT & PLAN NOTE
Moist heat for 15 minutes BID  Patient refused PT at this time  Cyclobenzaprine 5 mg TID for 10 days, start at night to make sure it does not make you drowsy     Ibuprofen 400 mg up to 4 times a day as needed

## 2021-08-19 NOTE — PATIENT INSTRUCTIONS

## 2021-08-19 NOTE — PROGRESS NOTES
[address]    NAME: Ciara Prince  AGE: 29 y o  SEX: female  : 1985     DATE: 2021     Assessment and Plan:     Problem List Items Addressed This Visit        Other    Chronic bilateral low back pain without sciatica     Moist heat for 15 minutes BID  Patient refused PT at this time  Cyclobenzaprine 5 mg TID for 10 days, start at night to make sure it does not make you drowsy  Ibuprofen 400 mg up to 4 times a day as needed             Other Visit Diagnoses     Annual physical exam    -  Primary    Fatigue, unspecified type        Relevant Orders    Microalbumin / creatinine urine ratio    Comprehensive metabolic panel    TSH, 3rd generation with Free T4 reflex    Vitamin D 25 hydroxy    CBC and Platelet    Vitamin D deficiency        Relevant Orders    Vitamin D 25 hydroxy    Overweight (BMI 25 0-29  9)        Relevant Orders    Lipid panel    Microalbumin / creatinine urine ratio    Comprehensive metabolic panel    TSH, 3rd generation with Free T4 reflex    Vitamin D 25 hydroxy    CBC and Platelet    Screening for HIV (human immunodeficiency virus)        Relevant Orders    HIV 1/2 Antigen/Antibody (4th Generation) w Reflex [facility]    Need for hepatitis C screening test        Relevant Orders    Hepatitis C antibody    Varicose veins of both legs with edema        Acute bilateral low back pain with right-sided sciatica        Relevant Medications    cyclobenzaprine (FLEXERIL) 5 mg tablet          Immunizations and preventive care screenings were discussed with patient today  Appropriate education was printed on patient's after visit summary  Counseling:  · Exercise: the importance of regular exercise/physical activity was discussed  Recommend exercise 3-5 times per week for at least 30 minutes  Return in about 6 weeks (around 2021)       Chief Complaint:     Chief Complaint   Patient presents with    New Patient Visit     28 y/o     Muscle Pain     x 2 weeks     Dizziness    Tiredness      History of Present Illness:     Adult Annual Physical   Patient here for a comprehensive physical exam  The patient reports problems - back pain  Diet and Physical Activity  · Diet/Nutrition: well balanced diet  · Exercise: no formal exercise  Depression Screening  PHQ-9 Depression Screening    PHQ-9:   Frequency of the following problems over the past two weeks:      Little interest or pleasure in doing things: 1 - several days  Feeling down, depressed, or hopeless: 1 - several days  PHQ-2 Score: 2       General Health  · Sleep: unrefreshing sleep  · Hearing: normal - bilateral   · Vision: no vision problems  · Dental: no dental visits for >1 year  /GYN Health  · Last menstrual period: unsure of last date  · Contraceptive method: IUD placement  · History of STDs?: no      Review of Systems:     Review of Systems   Musculoskeletal: Positive for back pain  Neurological: Positive for dizziness  All other systems reviewed and are negative  Past Medical History:     History reviewed  No pertinent past medical history     Past Surgical History:     Past Surgical History:   Procedure Laterality Date    BREAST IMPLANT      CARDIAC CATHETERIZATION        Social History:     Social History     Socioeconomic History    Marital status: Single     Spouse name: None    Number of children: None    Years of education: None    Highest education level: None   Occupational History    None   Tobacco Use    Smoking status: Never Smoker    Smokeless tobacco: Never Used   Substance and Sexual Activity    Alcohol use: No    Drug use: No    Sexual activity: Not Currently     Partners: Male     Comment: 10/2014 PARAGARD IUD   Other Topics Concern    None   Social History Narrative    None     Social Determinants of Health     Financial Resource Strain:     Difficulty of Paying Living Expenses: Food Insecurity:     Worried About Running Out of Food in the Last Year:     [address] in the Last Year:    Transportation Needs:     Lack of Transportation (Medical):  Lack of Transportation (Non-Medical):    Physical Activity:     Days of Exercise per Week:     Minutes of Exercise per Session:    Stress:     Feeling of Stress :    Social Connections:     Frequency of Communication with Friends and Family:     Frequency of Social Gatherings with Friends and Family:     Attends Christian Services:     Active Member of Clubs or Organizations:     Attends Club or Organization Meetings:     Marital Status:    Intimate Partner Violence:     Fear of Current or Ex-Partner:     Emotionally Abused:     Physically Abused:     Sexually Abused:       Family History:     Family History   Problem Relation Age of Onset    Hypertension Mother     Hypertension Father     Diabetes Father       Current Medications:     Current Outpatient Medications   Medication Sig Dispense Refill    cyclobenzaprine (FLEXERIL) 5 mg tablet Take 1 tablet (5 mg total) by mouth 3 (three) times a day as needed for muscle spasms 90 tablet 0    ferrous sulfate 325 (65 Fe) mg tablet Take 1 tablet (325 mg total) by mouth daily (Patient not taking: Reported on 8/19/2021) 30 tablet 5    metoclopramide (REGLAN) 10 mg tablet Take 1 tablet (10 mg total) by mouth every 6 (six) hours as needed (nausea) (Patient not taking: Reported on 8/19/2021) 30 tablet 3    Prenat w/o A-[id number]-[id number]-FA-DHA (PNV OB+DHA) 27-1 & 250 MG MISC Take 1 tablet by mouth daily (Patient not taking: Reported on 8/19/2021) 30 each 6     No current facility-administered medications for this visit        Allergies:     No Known Allergies   Physical Exam:     /68 (BP Location: Left arm, Patient Position: Sitting, Cuff Size: Standard)   Pulse 70   Temp 97 8 °F (36 6 °C) (Temporal)   Resp 18   Ht 5' 4 75" (1 645 m)   Wt 76 2 kg (167 lb 14 4 oz)   LMP 08/15/2021 (Within Days) Comment: irregular due to birth control   SpO2 96%   BMI 28 16 kg/m²     Physical Exam  Vitals and nursing note reviewed  Constitutional:       General: She is not in acute distress  Appearance: She is well-developed  HENT:      Head: Normocephalic and atraumatic  Eyes:      Conjunctiva/sclera: Conjunctivae normal    Cardiovascular:      Rate and Rhythm: Normal rate and regular rhythm  Heart sounds: No murmur heard  Pulmonary:      Effort: Pulmonary effort is normal  No respiratory distress  Breath sounds: Normal breath sounds  Abdominal:      Palpations: Abdomen is soft  Tenderness: There is no abdominal tenderness  Musculoskeletal:         General: Tenderness present  Cervical back: Normal and neck supple  Thoracic back: Spasms and tenderness present  Lumbar back: Spasms and tenderness present  Decreased range of motion  Skin:     General: Skin is warm and dry  Neurological:      Mental Status: She is alert            Fabio Wheat MD   [place] [id number]

## 2021-08-24 ENCOUNTER — APPOINTMENT (OUTPATIENT)
Dept: LAB | Facility: CLINIC | Age: 36
End: 2021-08-24
Payer: COMMERCIAL

## 2021-08-24 DIAGNOSIS — Z11.59 NEED FOR HEPATITIS C SCREENING TEST: ICD-10-CM

## 2021-08-24 DIAGNOSIS — E66.3 OVERWEIGHT (BMI 25.0-29.9): ICD-10-CM

## 2021-08-24 DIAGNOSIS — Z11.4 SCREENING FOR HIV (HUMAN IMMUNODEFICIENCY VIRUS): ICD-10-CM

## 2021-08-24 DIAGNOSIS — E55.9 VITAMIN D DEFICIENCY: ICD-10-CM

## 2021-08-24 DIAGNOSIS — R53.83 FATIGUE, UNSPECIFIED TYPE: ICD-10-CM

## 2021-08-24 LAB
25(OH)D3 SERPL-MCNC: 23.1 NG/ML (ref 30–100)
ALBUMIN SERPL BCP-MCNC: 3.7 G/DL (ref 3.5–5)
ALP SERPL-CCNC: 69 U/L (ref 46–116)
ALT SERPL W P-5'-P-CCNC: 20 U/L (ref 12–78)
ANION GAP SERPL CALCULATED.3IONS-SCNC: 2 MMOL/L (ref 4–13)
AST SERPL W P-5'-P-CCNC: 11 U/L (ref 5–45)
BILIRUB SERPL-MCNC: 0.51 MG/DL (ref 0.2–1)
BUN SERPL-MCNC: 12 MG/DL (ref 5–25)
CALCIUM SERPL-MCNC: 8.7 MG/DL (ref 8.3–10.1)
CHLORIDE SERPL-SCNC: 107 MMOL/L (ref 100–108)
CHOLEST SERPL-MCNC: 158 MG/DL (ref 50–200)
CO2 SERPL-SCNC: 29 MMOL/L (ref 21–32)
CREAT SERPL-MCNC: 0.68 MG/DL (ref 0.6–1.3)
CREAT UR-MCNC: <13 MG/DL
ERYTHROCYTE [DISTWIDTH] IN BLOOD BY AUTOMATED COUNT: 11.7 % (ref 11.6–15.1)
GFR SERPL CREATININE-BSD FRML MDRD: 114 ML/MIN/1.73SQ M
GLUCOSE P FAST SERPL-MCNC: 80 MG/DL (ref 65–99)
HCT VFR BLD AUTO: 39.9 % (ref 34.8–46.1)
HDLC SERPL-MCNC: 55 MG/DL
HGB BLD-MCNC: 12.6 G/DL (ref 11.5–15.4)
LDLC SERPL CALC-MCNC: 83 MG/DL (ref 0–100)
MCH RBC QN AUTO: 30 PG (ref 26.8–34.3)
MCHC RBC AUTO-ENTMCNC: 31.6 G/DL (ref 31.4–37.4)
MCV RBC AUTO: 95 FL (ref 82–98)
MICROALBUMIN UR-MCNC: 8.4 MG/L (ref 0–20)
MICROALBUMIN/CREAT 24H UR: >65 MG/G CREATININE (ref 0–30)
NONHDLC SERPL-MCNC: 103 MG/DL
PLATELET # BLD AUTO: 351 THOUSANDS/UL (ref 149–390)
PMV BLD AUTO: 10.3 FL (ref 8.9–12.7)
POTASSIUM SERPL-SCNC: 3.4 MMOL/L (ref 3.5–5.3)
PROT SERPL-MCNC: 7.6 G/DL (ref 6.4–8.2)
RBC # BLD AUTO: 4.2 MILLION/UL (ref 3.81–5.12)
SODIUM SERPL-SCNC: 138 MMOL/L (ref 136–145)
TRIGL SERPL-MCNC: 98 MG/DL
TSH SERPL DL<=0.05 MIU/L-ACNC: 1.73 UIU/ML (ref 0.36–3.74)
WBC # BLD AUTO: 7.57 THOUSAND/UL (ref 4.31–10.16)

## 2021-08-24 PROCEDURE — 82043 UR ALBUMIN QUANTITATIVE: CPT

## 2021-08-24 PROCEDURE — 82570 ASSAY OF URINE CREATININE: CPT

## 2021-08-24 PROCEDURE — 36415 COLL VENOUS BLD VENIPUNCTURE: CPT

## 2021-08-24 PROCEDURE — 80053 COMPREHEN METABOLIC PANEL: CPT

## 2021-08-24 PROCEDURE — 82306 VITAMIN D 25 HYDROXY: CPT

## 2021-08-24 PROCEDURE — 85027 COMPLETE CBC AUTOMATED: CPT

## 2021-08-24 PROCEDURE — 87389 HIV-1 AG W/HIV-1&-2 AB AG IA: CPT

## 2021-08-24 PROCEDURE — 84443 ASSAY THYROID STIM HORMONE: CPT

## 2021-08-24 PROCEDURE — 80061 LIPID PANEL: CPT

## 2021-08-24 PROCEDURE — 86803 HEPATITIS C AB TEST: CPT

## 2021-08-25 LAB
HCV AB SER QL: NORMAL
HIV 1+2 AB+HIV1 P24 AG SERPL QL IA: NORMAL

## 2021-10-07 ENCOUNTER — OFFICE VISIT (OUTPATIENT)
Dept: FAMILY MEDICINE CLINIC | Facility: CLINIC | Age: 36
End: 2021-10-07

## 2021-10-07 VITALS
WEIGHT: 168.2 LBS | BODY MASS INDEX: 28.02 KG/M2 | OXYGEN SATURATION: 99 % | HEIGHT: 65 IN | TEMPERATURE: 97.9 F | DIASTOLIC BLOOD PRESSURE: 70 MMHG | SYSTOLIC BLOOD PRESSURE: 114 MMHG | RESPIRATION RATE: 16 BRPM | HEART RATE: 63 BPM

## 2021-10-07 DIAGNOSIS — G89.29 CHRONIC BILATERAL LOW BACK PAIN WITHOUT SCIATICA: Primary | ICD-10-CM

## 2021-10-07 DIAGNOSIS — M54.50 CHRONIC BILATERAL LOW BACK PAIN WITHOUT SCIATICA: Primary | ICD-10-CM

## 2021-10-07 PROCEDURE — 99214 OFFICE O/P EST MOD 30 MIN: CPT | Performed by: FAMILY MEDICINE

## 2021-10-30 DIAGNOSIS — M54.50 CHRONIC BILATERAL LOW BACK PAIN WITHOUT SCIATICA: ICD-10-CM

## 2021-10-30 DIAGNOSIS — G89.29 CHRONIC BILATERAL LOW BACK PAIN WITHOUT SCIATICA: ICD-10-CM

## 2021-12-09 ENCOUNTER — OFFICE VISIT (OUTPATIENT)
Dept: FAMILY MEDICINE CLINIC | Facility: CLINIC | Age: 36
End: 2021-12-09

## 2021-12-09 VITALS
HEART RATE: 77 BPM | DIASTOLIC BLOOD PRESSURE: 64 MMHG | SYSTOLIC BLOOD PRESSURE: 100 MMHG | WEIGHT: 172.4 LBS | BODY MASS INDEX: 28.72 KG/M2 | OXYGEN SATURATION: 99 % | RESPIRATION RATE: 16 BRPM | HEIGHT: 65 IN | TEMPERATURE: 98 F

## 2021-12-09 DIAGNOSIS — M54.50 CHRONIC BILATERAL LOW BACK PAIN WITHOUT SCIATICA: Primary | ICD-10-CM

## 2021-12-09 DIAGNOSIS — E87.6 HYPOKALEMIA: ICD-10-CM

## 2021-12-09 DIAGNOSIS — E66.3 OVERWEIGHT (BMI 25.0-29.9): ICD-10-CM

## 2021-12-09 DIAGNOSIS — E55.9 VITAMIN D DEFICIENCY: ICD-10-CM

## 2021-12-09 DIAGNOSIS — G89.29 CHRONIC BILATERAL LOW BACK PAIN WITHOUT SCIATICA: Primary | ICD-10-CM

## 2021-12-09 PROCEDURE — 99214 OFFICE O/P EST MOD 30 MIN: CPT | Performed by: FAMILY MEDICINE

## 2022-03-10 ENCOUNTER — APPOINTMENT (OUTPATIENT)
Dept: LAB | Facility: CLINIC | Age: 37
End: 2022-03-10
Payer: COMMERCIAL

## 2022-03-10 ENCOUNTER — OFFICE VISIT (OUTPATIENT)
Dept: FAMILY MEDICINE CLINIC | Facility: CLINIC | Age: 37
End: 2022-03-10

## 2022-03-10 VITALS
RESPIRATION RATE: 16 BRPM | OXYGEN SATURATION: 97 % | WEIGHT: 173.1 LBS | DIASTOLIC BLOOD PRESSURE: 70 MMHG | BODY MASS INDEX: 28.84 KG/M2 | SYSTOLIC BLOOD PRESSURE: 116 MMHG | HEART RATE: 70 BPM | TEMPERATURE: 97.7 F | HEIGHT: 65 IN

## 2022-03-10 DIAGNOSIS — G89.29 CHRONIC BILATERAL LOW BACK PAIN WITHOUT SCIATICA: ICD-10-CM

## 2022-03-10 DIAGNOSIS — E87.6 HYPOKALEMIA: ICD-10-CM

## 2022-03-10 DIAGNOSIS — E55.9 VITAMIN D DEFICIENCY: ICD-10-CM

## 2022-03-10 DIAGNOSIS — M54.50 CHRONIC BILATERAL LOW BACK PAIN WITHOUT SCIATICA: ICD-10-CM

## 2022-03-10 DIAGNOSIS — E66.3 OVERWEIGHT (BMI 25.0-29.9): ICD-10-CM

## 2022-03-10 DIAGNOSIS — M54.41 ACUTE BILATERAL LOW BACK PAIN WITH RIGHT-SIDED SCIATICA: ICD-10-CM

## 2022-03-10 DIAGNOSIS — E55.9 VITAMIN D DEFICIENCY: Primary | ICD-10-CM

## 2022-03-10 LAB
25(OH)D3 SERPL-MCNC: 26.2 NG/ML (ref 30–100)
ALBUMIN SERPL BCP-MCNC: 3.8 G/DL (ref 3.5–5)
ALP SERPL-CCNC: 71 U/L (ref 46–116)
ALT SERPL W P-5'-P-CCNC: 21 U/L (ref 12–78)
ANION GAP SERPL CALCULATED.3IONS-SCNC: 1 MMOL/L (ref 4–13)
AST SERPL W P-5'-P-CCNC: 12 U/L (ref 5–45)
BASOPHILS # BLD AUTO: 0.08 THOUSANDS/ΜL (ref 0–0.1)
BASOPHILS NFR BLD AUTO: 1 % (ref 0–1)
BILIRUB SERPL-MCNC: 0.45 MG/DL (ref 0.2–1)
BUN SERPL-MCNC: 12 MG/DL (ref 5–25)
CALCIUM SERPL-MCNC: 9.3 MG/DL (ref 8.3–10.1)
CHLORIDE SERPL-SCNC: 108 MMOL/L (ref 100–108)
CO2 SERPL-SCNC: 30 MMOL/L (ref 21–32)
CREAT SERPL-MCNC: 0.72 MG/DL (ref 0.6–1.3)
EOSINOPHIL # BLD AUTO: 0.18 THOUSAND/ΜL (ref 0–0.61)
EOSINOPHIL NFR BLD AUTO: 3 % (ref 0–6)
ERYTHROCYTE [DISTWIDTH] IN BLOOD BY AUTOMATED COUNT: 11.9 % (ref 11.6–15.1)
GFR SERPL CREATININE-BSD FRML MDRD: 108 ML/MIN/1.73SQ M
GLUCOSE P FAST SERPL-MCNC: 97 MG/DL (ref 65–99)
HCT VFR BLD AUTO: 43 % (ref 34.8–46.1)
HGB BLD-MCNC: 13.3 G/DL (ref 11.5–15.4)
IMM GRANULOCYTES # BLD AUTO: 0.01 THOUSAND/UL (ref 0–0.2)
IMM GRANULOCYTES NFR BLD AUTO: 0 % (ref 0–2)
LYMPHOCYTES # BLD AUTO: 2.59 THOUSANDS/ΜL (ref 0.6–4.47)
LYMPHOCYTES NFR BLD AUTO: 48 % (ref 14–44)
MCH RBC QN AUTO: 29.6 PG (ref 26.8–34.3)
MCHC RBC AUTO-ENTMCNC: 30.9 G/DL (ref 31.4–37.4)
MCV RBC AUTO: 96 FL (ref 82–98)
MONOCYTES # BLD AUTO: 0.4 THOUSAND/ΜL (ref 0.17–1.22)
MONOCYTES NFR BLD AUTO: 7 % (ref 4–12)
NEUTROPHILS # BLD AUTO: 2.27 THOUSANDS/ΜL (ref 1.85–7.62)
NEUTS SEG NFR BLD AUTO: 41 % (ref 43–75)
NRBC BLD AUTO-RTO: 0 /100 WBCS
PLATELET # BLD AUTO: 378 THOUSANDS/UL (ref 149–390)
PMV BLD AUTO: 10.3 FL (ref 8.9–12.7)
POTASSIUM SERPL-SCNC: 3.9 MMOL/L (ref 3.5–5.3)
PROT SERPL-MCNC: 7.8 G/DL (ref 6.4–8.2)
RBC # BLD AUTO: 4.49 MILLION/UL (ref 3.81–5.12)
SODIUM SERPL-SCNC: 139 MMOL/L (ref 136–145)
WBC # BLD AUTO: 5.53 THOUSAND/UL (ref 4.31–10.16)

## 2022-03-10 PROCEDURE — 80053 COMPREHEN METABOLIC PANEL: CPT

## 2022-03-10 PROCEDURE — 99214 OFFICE O/P EST MOD 30 MIN: CPT | Performed by: FAMILY MEDICINE

## 2022-03-10 PROCEDURE — 36415 COLL VENOUS BLD VENIPUNCTURE: CPT

## 2022-03-10 PROCEDURE — 85025 COMPLETE CBC W/AUTO DIFF WBC: CPT

## 2022-03-10 PROCEDURE — 82306 VITAMIN D 25 HYDROXY: CPT

## 2022-03-10 NOTE — ASSESSMENT & PLAN NOTE
BMI Counseling: Body mass index is 29 03 kg/m²  The BMI is above normal  Nutrition recommendations include reducing portion sizes, decreasing overall calorie intake, 3-5 servings of fruits/vegetables daily, reducing fast food intake, consuming healthier snacks and decreasing soda and/or juice intake  Exercise recommendations include moderate aerobic physical activity for 150 minutes/week, exercising 3-5 times per week and strength training exercises

## 2022-03-10 NOTE — PROGRESS NOTES
Assessment/Plan:    Overweight (BMI 25 0-29  9)  BMI Counseling: Body mass index is 29 03 kg/m²  The BMI is above normal  Nutrition recommendations include reducing portion sizes, decreasing overall calorie intake, 3-5 servings of fruits/vegetables daily, reducing fast food intake, consuming healthier snacks and decreasing soda and/or juice intake  Exercise recommendations include moderate aerobic physical activity for 150 minutes/week, exercising 3-5 times per week and strength training exercises  Chronic bilateral low back pain without sciatica  Moist heat for 15 minutes BID  Stay active  Continue stretching exercises        Diagnoses and all orders for this visit:    Vitamin D deficiency    Overweight (BMI 25 0-29  9)    Acute bilateral low back pain with right-sided sciatica    Chronic bilateral low back pain without sciatica        Check Vit D level as requested at last visit  Subjective:      Patient ID: Jude Clay is a 40 y o  female  39 yo female here today for follow up  As per patient back pain has improved, states back pain acts up when she is very stressed but has been trying to learn how to christopher it  Back pain is mild, not radiated, 5/10 at its worse, improves with stretching exercises  No red flags with back pain  The following portions of the patient's history were reviewed and updated as appropriate:   She  has no past medical history on file  She   Patient Active Problem List    Diagnosis Date Noted    Overweight (BMI 25 0-29 9) 03/10/2022    8 weeks gestation of pregnancy 01/22/2019    Amenorrhea 01/22/2019    Iron deficiency anemia 11/07/2018    Chronic bilateral low back pain without sciatica 08/28/2018     She  has a past surgical history that includes Cardiac catheterization and BREAST IMPLANT  Her family history includes Diabetes in her father; Hypertension in her father and mother  She  reports that she has never smoked   She has never used smokeless tobacco  She reports that she does not drink alcohol and does not use drugs  Current Outpatient Medications   Medication Sig Dispense Refill    diclofenac sodium (VOLTAREN) 50 mg EC tablet TAKE 1 TABLET BY MOUTH TWICE A DAY 60 tablet 0    ergocalciferol (VITAMIN D2) 50,000 units Take 1 capsule (50,000 Units total) by mouth once a week 4 capsule 2    ferrous sulfate 325 (65 Fe) mg tablet Take 1 tablet (325 mg total) by mouth daily (Patient not taking: Reported on 8/19/2021) 30 tablet 5    metoclopramide (REGLAN) 10 mg tablet Take 1 tablet (10 mg total) by mouth every 6 (six) hours as needed (nausea) (Patient not taking: Reported on 8/19/2021) 30 tablet 3    Prenat w/o A-FeCbGl-DSS-FA-DHA (PNV OB+DHA) 27-1 & 250 MG MISC Take 1 tablet by mouth daily (Patient not taking: Reported on 8/19/2021) 30 each 6     No current facility-administered medications for this visit  Current Outpatient Medications on File Prior to Visit   Medication Sig    diclofenac sodium (VOLTAREN) 50 mg EC tablet TAKE 1 TABLET BY MOUTH TWICE A DAY    ergocalciferol (VITAMIN D2) 50,000 units Take 1 capsule (50,000 Units total) by mouth once a week    ferrous sulfate 325 (65 Fe) mg tablet Take 1 tablet (325 mg total) by mouth daily (Patient not taking: Reported on 8/19/2021)    metoclopramide (REGLAN) 10 mg tablet Take 1 tablet (10 mg total) by mouth every 6 (six) hours as needed (nausea) (Patient not taking: Reported on 8/19/2021)    Prenat w/o A-FeCbGl-DSS-FA-DHA (PNV OB+DHA) 27-1 & 250 MG MISC Take 1 tablet by mouth daily (Patient not taking: Reported on 8/19/2021)     No current facility-administered medications on file prior to visit       Review of Systems   Musculoskeletal: Positive for back pain  All other systems reviewed and are negative          Objective:      /70 (BP Location: Left arm, Patient Position: Sitting, Cuff Size: Standard)   Pulse 70   Temp 97 7 °F (36 5 °C) (Temporal)   Resp 16   Ht 5' 4 75" (1 645 m)   Wt 78 5 kg (173 lb 1 6 oz)   LMP 03/09/2022 (Within Days) Comment: IUD  SpO2 97%   BMI 29 03 kg/m²          Physical Exam  Vitals and nursing note reviewed  Constitutional:       Appearance: She is well-developed  HENT:      Head: Normocephalic  Right Ear: External ear normal       Left Ear: External ear normal       Nose: Nose normal    Eyes:      Conjunctiva/sclera: Conjunctivae normal       Pupils: Pupils are equal, round, and reactive to light  Neck:      Thyroid: No thyromegaly  Cardiovascular:      Rate and Rhythm: Normal rate and regular rhythm  Heart sounds: Normal heart sounds  Pulmonary:      Effort: Pulmonary effort is normal       Breath sounds: Normal breath sounds  Abdominal:      Palpations: Abdomen is soft  Tenderness: There is no abdominal tenderness  There is no guarding or rebound  Musculoskeletal:         General: Normal range of motion  Cervical back: Normal range of motion and neck supple  Skin:     General: Skin is dry  Neurological:      Mental Status: She is alert and oriented to person, place, and time  Deep Tendon Reflexes: Reflexes are normal and symmetric

## 2022-03-14 DIAGNOSIS — E55.9 VITAMIN D DEFICIENCY: ICD-10-CM

## 2022-03-14 RX ORDER — ERGOCALCIFEROL 1.25 MG/1
50000 CAPSULE ORAL WEEKLY
Qty: 4 CAPSULE | Refills: 2 | Status: SHIPPED | OUTPATIENT
Start: 2022-03-14

## 2022-10-06 ENCOUNTER — OFFICE VISIT (OUTPATIENT)
Dept: FAMILY MEDICINE CLINIC | Facility: CLINIC | Age: 37
End: 2022-10-06

## 2022-10-06 VITALS
OXYGEN SATURATION: 99 % | TEMPERATURE: 97.8 F | RESPIRATION RATE: 14 BRPM | BODY MASS INDEX: 28.11 KG/M2 | HEART RATE: 71 BPM | WEIGHT: 168.7 LBS | DIASTOLIC BLOOD PRESSURE: 80 MMHG | HEIGHT: 65 IN | SYSTOLIC BLOOD PRESSURE: 116 MMHG

## 2022-10-06 DIAGNOSIS — R07.89 CHEST WALL PAIN: Primary | ICD-10-CM

## 2022-10-06 DIAGNOSIS — E55.9 VITAMIN D DEFICIENCY: ICD-10-CM

## 2022-10-06 DIAGNOSIS — Z12.4 SCREENING FOR CERVICAL CANCER: ICD-10-CM

## 2022-10-06 DIAGNOSIS — E66.3 OVERWEIGHT (BMI 25.0-29.9): ICD-10-CM

## 2022-10-06 PROCEDURE — 99214 OFFICE O/P EST MOD 30 MIN: CPT | Performed by: FAMILY MEDICINE

## 2022-10-06 NOTE — LETTER
October 6, 2022     Patient: Gene Chatman  YOB: 1985  Date of Visit: 10/6/2022      To Whom it May Concern:    Donna Anselmo is under my professional care  Zachary Lopez was seen in my office on 10/6/2022  Zachary Lopez may return to work on 10/7/2022  If you have any questions or concerns, please don't hesitate to call           Sincerely,          Veronica Contreras MD        CC: No Recipients
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
## 2022-10-13 ENCOUNTER — APPOINTMENT (OUTPATIENT)
Dept: LAB | Facility: CLINIC | Age: 37
End: 2022-10-13
Payer: COMMERCIAL

## 2022-10-13 DIAGNOSIS — E55.9 VITAMIN D DEFICIENCY: ICD-10-CM

## 2022-10-13 DIAGNOSIS — E66.3 OVERWEIGHT (BMI 25.0-29.9): ICD-10-CM

## 2022-10-13 LAB
25(OH)D3 SERPL-MCNC: 26.4 NG/ML (ref 30–100)
ALBUMIN SERPL BCP-MCNC: 3.5 G/DL (ref 3.5–5)
ALP SERPL-CCNC: 68 U/L (ref 46–116)
ALT SERPL W P-5'-P-CCNC: 23 U/L (ref 12–78)
ANION GAP SERPL CALCULATED.3IONS-SCNC: 5 MMOL/L (ref 4–13)
AST SERPL W P-5'-P-CCNC: 15 U/L (ref 5–45)
BASOPHILS # BLD AUTO: 0.07 THOUSANDS/ΜL (ref 0–0.1)
BASOPHILS NFR BLD AUTO: 2 % (ref 0–1)
BILIRUB SERPL-MCNC: 0.52 MG/DL (ref 0.2–1)
BUN SERPL-MCNC: 11 MG/DL (ref 5–25)
CALCIUM SERPL-MCNC: 9.2 MG/DL (ref 8.3–10.1)
CHLORIDE SERPL-SCNC: 108 MMOL/L (ref 96–108)
CHOLEST SERPL-MCNC: 172 MG/DL
CO2 SERPL-SCNC: 27 MMOL/L (ref 21–32)
CREAT SERPL-MCNC: 0.81 MG/DL (ref 0.6–1.3)
CREAT UR-MCNC: 235 MG/DL
EOSINOPHIL # BLD AUTO: 0.23 THOUSAND/ΜL (ref 0–0.61)
EOSINOPHIL NFR BLD AUTO: 5 % (ref 0–6)
ERYTHROCYTE [DISTWIDTH] IN BLOOD BY AUTOMATED COUNT: 11.6 % (ref 11.6–15.1)
GFR SERPL CREATININE-BSD FRML MDRD: 93 ML/MIN/1.73SQ M
GLUCOSE P FAST SERPL-MCNC: 87 MG/DL (ref 65–99)
HCT VFR BLD AUTO: 41.9 % (ref 34.8–46.1)
HDLC SERPL-MCNC: 47 MG/DL
HGB BLD-MCNC: 13 G/DL (ref 11.5–15.4)
IMM GRANULOCYTES # BLD AUTO: 0.01 THOUSAND/UL (ref 0–0.2)
IMM GRANULOCYTES NFR BLD AUTO: 0 % (ref 0–2)
LDLC SERPL CALC-MCNC: 107 MG/DL (ref 0–100)
LYMPHOCYTES # BLD AUTO: 1.91 THOUSANDS/ΜL (ref 0.6–4.47)
LYMPHOCYTES NFR BLD AUTO: 40 % (ref 14–44)
MCH RBC QN AUTO: 29.6 PG (ref 26.8–34.3)
MCHC RBC AUTO-ENTMCNC: 31 G/DL (ref 31.4–37.4)
MCV RBC AUTO: 95 FL (ref 82–98)
MICROALBUMIN UR-MCNC: 35.9 MG/L (ref 0–20)
MICROALBUMIN/CREAT 24H UR: 15 MG/G CREATININE (ref 0–30)
MONOCYTES # BLD AUTO: 0.42 THOUSAND/ΜL (ref 0.17–1.22)
MONOCYTES NFR BLD AUTO: 9 % (ref 4–12)
NEUTROPHILS # BLD AUTO: 2.12 THOUSANDS/ΜL (ref 1.85–7.62)
NEUTS SEG NFR BLD AUTO: 44 % (ref 43–75)
NONHDLC SERPL-MCNC: 125 MG/DL
NRBC BLD AUTO-RTO: 0 /100 WBCS
PLATELET # BLD AUTO: 327 THOUSANDS/UL (ref 149–390)
PMV BLD AUTO: 10.3 FL (ref 8.9–12.7)
POTASSIUM SERPL-SCNC: 4 MMOL/L (ref 3.5–5.3)
PROT SERPL-MCNC: 7.8 G/DL (ref 6.4–8.4)
RBC # BLD AUTO: 4.39 MILLION/UL (ref 3.81–5.12)
SODIUM SERPL-SCNC: 140 MMOL/L (ref 135–147)
TRIGL SERPL-MCNC: 92 MG/DL
TSH SERPL DL<=0.05 MIU/L-ACNC: 0.88 UIU/ML (ref 0.45–4.5)
WBC # BLD AUTO: 4.76 THOUSAND/UL (ref 4.31–10.16)

## 2022-10-13 PROCEDURE — 36415 COLL VENOUS BLD VENIPUNCTURE: CPT

## 2022-10-13 PROCEDURE — 85025 COMPLETE CBC W/AUTO DIFF WBC: CPT

## 2022-10-13 PROCEDURE — 80061 LIPID PANEL: CPT

## 2022-10-13 PROCEDURE — 82306 VITAMIN D 25 HYDROXY: CPT

## 2022-10-13 PROCEDURE — 82570 ASSAY OF URINE CREATININE: CPT

## 2022-10-13 PROCEDURE — 84443 ASSAY THYROID STIM HORMONE: CPT

## 2022-10-13 PROCEDURE — 80053 COMPREHEN METABOLIC PANEL: CPT

## 2022-10-13 PROCEDURE — 82043 UR ALBUMIN QUANTITATIVE: CPT

## 2022-11-28 ENCOUNTER — OFFICE VISIT (OUTPATIENT)
Dept: OBGYN CLINIC | Facility: CLINIC | Age: 37
End: 2022-11-28

## 2022-11-28 VITALS
DIASTOLIC BLOOD PRESSURE: 76 MMHG | BODY MASS INDEX: 34.04 KG/M2 | WEIGHT: 173.4 LBS | HEIGHT: 60 IN | HEART RATE: 62 BPM | SYSTOLIC BLOOD PRESSURE: 115 MMHG

## 2022-11-28 DIAGNOSIS — N89.8 VAGINAL DISCHARGE: Primary | ICD-10-CM

## 2022-11-28 DIAGNOSIS — Z20.2 POSSIBLE EXPOSURE TO STD: ICD-10-CM

## 2022-11-28 LAB
BV WHIFF TEST VAG QL: ABNORMAL
CLUE CELLS SPEC QL WET PREP: POSITIVE
PH SMN: 5.5 [PH]
SL AMB POCT WET MOUNT: POSITIVE
T VAGINALIS VAG QL WET PREP: NEGATIVE
YEAST VAG QL WET PREP: NEGATIVE

## 2022-11-28 RX ORDER — METRONIDAZOLE 500 MG/1
500 TABLET ORAL EVERY 12 HOURS SCHEDULED
Qty: 14 TABLET | Refills: 0 | Status: SHIPPED | OUTPATIENT
Start: 2022-11-28 | End: 2022-12-05

## 2022-11-28 NOTE — PROGRESS NOTES
Assessment/Plan:     No problem-specific Assessment & Plan notes found for this encounter  Diagnoses and all orders for this visit:    Vaginal discharge  -     HIV 1/2 ANTIGEN/ANTIBODY (4TH GENERATION) W REFLEX [facility]; Future  -     RPR; Future  -     Hepatitis C antibody; Future  -     metroNIDAZOLE (FLAGYL) 500 mg tablet; Take 1 tablet (500 mg total) by mouth every 12 (twelve) hours for 7 days  -     POCT wet mount    Possible exposure to STD  -     HIV 1/2 ANTIGEN/ANTIBODY (4TH GENERATION) W REFLEX [facility]; Future  -     RPR; Future  -     Hepatitis C antibody; Future      RTO for annual exam         Subjective:      Patient ID: Alan Lao is a 40 y o  female who presents for foul smelling vaginal discharge for the better part of the year  She does not use tampons  She has not tried anything  OTC  She is sexually active and sekou like STD testing  HPI    The following portions of the patient's history were reviewed and updated as appropriate: allergies, current medications, past family history, past medical history, past social history, past surgical history and problem list     Review of Systems      Objective:      /76   Pulse 62   Ht 5' (1 524 m)   Wt 78 7 kg (173 lb 6 4 oz)   LMP 11/14/2022 (Approximate)   BMI 33 86 kg/m²          Physical Exam  Vitals and nursing note reviewed  Exam conducted with a chaperone present  Constitutional:       Appearance: Normal appearance  Pulmonary:      Effort: Pulmonary effort is normal    Genitourinary:     Labia:         Right: No rash, tenderness, lesion or injury  Left: No rash, tenderness, lesion or injury  Vagina: No signs of injury and foreign body  Vaginal discharge present  No erythema, tenderness, bleeding, lesions or prolapsed vaginal walls  Neurological:      General: No focal deficit present  Mental Status: She is alert and oriented to person, place, and time     Psychiatric:         Mood and Affect: Mood normal          Behavior: Behavior normal

## 2022-11-29 LAB
C TRACH DNA SPEC QL NAA+PROBE: NEGATIVE
N GONORRHOEA DNA SPEC QL NAA+PROBE: NEGATIVE

## 2022-12-01 ENCOUNTER — TELEPHONE (OUTPATIENT)
Dept: FAMILY MEDICINE CLINIC | Facility: CLINIC | Age: 37
End: 2022-12-01

## 2022-12-01 ENCOUNTER — APPOINTMENT (OUTPATIENT)
Dept: LAB | Facility: CLINIC | Age: 37
End: 2022-12-01

## 2022-12-01 DIAGNOSIS — N89.8 VAGINAL DISCHARGE: ICD-10-CM

## 2022-12-01 DIAGNOSIS — Z20.2 POSSIBLE EXPOSURE TO STD: ICD-10-CM

## 2022-12-02 LAB
HCV AB SER QL: NORMAL
HIV 1+2 AB+HIV1 P24 AG SERPL QL IA: NORMAL
RPR SER QL: NORMAL

## 2022-12-12 ENCOUNTER — TELEPHONE (OUTPATIENT)
Dept: OBGYN CLINIC | Facility: CLINIC | Age: 37
End: 2022-12-12

## 2022-12-12 NOTE — TELEPHONE ENCOUNTER
----- Message from Dash Lott MD sent at 12/9/2022 11:12 AM EST -----  [place]    Please inform neg GC/CT and STD blood work      T Y  ----- Message -----  From: Lab, Background User  Sent: 12/2/2022   9:05 AM EST  To: Dash Lott MD

## 2022-12-22 ENCOUNTER — OFFICE VISIT (OUTPATIENT)
Dept: OBGYN CLINIC | Facility: CLINIC | Age: 37
End: 2022-12-22

## 2022-12-22 VITALS
DIASTOLIC BLOOD PRESSURE: 72 MMHG | HEART RATE: 71 BPM | HEIGHT: 65 IN | BODY MASS INDEX: 28.92 KG/M2 | WEIGHT: 173.6 LBS | SYSTOLIC BLOOD PRESSURE: 113 MMHG

## 2022-12-22 DIAGNOSIS — Z12.4 SCREENING FOR CERVICAL CANCER: ICD-10-CM

## 2022-12-22 DIAGNOSIS — Z01.419 ROUTINE GYNECOLOGICAL EXAMINATION: Primary | ICD-10-CM

## 2022-12-22 NOTE — PATIENT INSTRUCTIONS
Curtis por nelson confianza en nuestro equipo  Mammie Ek y agradecemos renate comentarios  Si recibe pablo encuesta nuestra, tómese unos momentos para informarnos cómo estamos     Sinceramente,  [facility], DO

## 2022-12-22 NOTE — PROGRESS NOTES
[facility] is a 40 y o  female who presents today for annual GYN exam requesting permanent sterilization  Her last pap smear was performed 17 and result was negative  She reports no history of abnormal pap smears in her past   She reports menses as regular  Patient's last menstrual period was 12/15/2022 (exact date)  Her general medical history has been reviewed and she reports it as follows:    No past medical history on file  Past Surgical History:   Procedure Laterality Date   • BREAST IMPLANT     • CARDIAC CATHETERIZATION       OB History        4    Para   2    Term   2       0    AB   1    Living   2       SAB   1    IAB   0    Ectopic   0    Multiple   0    Live Births   1               Social History     Tobacco Use   • Smoking status: Never   • Smokeless tobacco: Never   Substance Use Topics   • Alcohol use: No   • Drug use: No     Social History     Substance and Sexual Activity   Sexual Activity Yes   • Partners: Male    Comment: 10/2014 PARAGARD IUD     Cancer-related family history is not on file  Current Outpatient Medications   Medication Instructions   • diclofenac sodium (VOLTAREN) 50 mg EC tablet TAKE 1 TABLET BY MOUTH TWICE A DAY   • ergocalciferol (VITAMIN D2) 50,000 Units, Oral, Weekly   • ferrous sulfate 325 mg, Oral, Daily   • metoclopramide (REGLAN) 10 mg, Oral, Every 6 hours PRN   • Prenat w/o A-FeCbGl-DSS-FA-DHA (PNV OB+DHA) 27-1 & 250 MG MISC 1 tablet, Oral, Daily       Review of Systems:  Review of Systems   All other systems reviewed and are negative  Physical Exam:  /72   Pulse 71   Ht 5' 5" (1 651 m)   Wt 78 7 kg (173 lb 9 6 oz)   LMP 12/15/2022 (Exact Date)   BMI 28 89 kg/m²   Physical Exam  Constitutional:       Appearance: Normal appearance  Genitourinary:      Bladder and urethral meatus normal       Right Labia: No rash, tenderness or lesions  Left Labia: No tenderness, lesions or rash       No inguinal adenopathy present in the right or left side  No vaginal discharge, erythema, tenderness, bleeding, ulceration or granulation tissue  Right Adnexa: not tender, not full and no mass present  Left Adnexa: not tender, not full and no mass present  No cervical motion tenderness, discharge or lesion  IUD strings visualized  Uterus is not enlarged or tender  No uterine mass detected  No urethral tenderness or mass present  Breasts:     Right: Breast implant present  No swelling, bleeding, inverted nipple, mass, nipple discharge, skin change or tenderness  Left: Breast implant present  No swelling, bleeding, inverted nipple, mass, nipple discharge, skin change or tenderness  HENT:      Head: Normocephalic  Cardiovascular:      Rate and Rhythm: Normal rate and regular rhythm  Pulmonary:      Effort: Pulmonary effort is normal       Breath sounds: Normal breath sounds  Abdominal:      General: Bowel sounds are normal       Palpations: Abdomen is soft  Hernia: There is no hernia in the left inguinal area or right inguinal area  Musculoskeletal:         General: Normal range of motion  Cervical back: Normal range of motion  Lymphadenopathy:      Upper Body:      Right upper body: No supraclavicular or axillary adenopathy  Left upper body: No supraclavicular or axillary adenopathy  Lower Body: No right inguinal adenopathy  No left inguinal adenopathy  Neurological:      Mental Status: She is alert and oriented to person, place, and time  Skin:     General: Skin is warm and dry  Psychiatric:         Mood and Affect: Mood normal    Vitals reviewed  Assessment/Plan:   1  Normal well-woman GYN exam   2  Cervical cancer screening:  Normal cervical exam   Pap smear done with HPV co-testing  3  STD screening:    Orders placed for vaginal GC/CT cultures      4  Breast cancer screening:  Normal breast exam   Reviewed breast self-awareness  5  Depression Screening: Patient's depression screening was assessed with a PHQ-2 score of 1  Their PHQ-9 score was 3  Clinically patient does not have depression  No treatment is required  6  BMI Counseling: Body mass index is 28 89 kg/m²  Discussed the patient's BMI with her  The BMI is above normal  Nutrition recommendations include decreasing overall calorie intake  7 Contraception:  iud requesting sterilization   8  Return to office 1st week of February 2023  Review[facility] with patient that [facility], but that they will not necessarily be reviewed by me immediately  Explained that I will review results at my earliest opportunity and contact patient appropriately

## 2022-12-24 LAB
C TRACH DNA SPEC QL NAA+PROBE: NEGATIVE
HPV HR 12 DNA CVX QL NAA+PROBE: NEGATIVE
HPV16 DNA CVX QL NAA+PROBE: POSITIVE
HPV18 DNA CVX QL NAA+PROBE: NEGATIVE
N GONORRHOEA DNA SPEC QL NAA+PROBE: NEGATIVE

## 2022-12-29 LAB
LAB AP GYN PRIMARY INTERPRETATION: NORMAL
Lab: NORMAL

## 2023-01-12 ENCOUNTER — PROCEDURE VISIT (OUTPATIENT)
Dept: OBGYN CLINIC | Facility: CLINIC | Age: 38
End: 2023-01-12

## 2023-01-12 VITALS
HEIGHT: 65 IN | DIASTOLIC BLOOD PRESSURE: 78 MMHG | BODY MASS INDEX: 29.29 KG/M2 | SYSTOLIC BLOOD PRESSURE: 122 MMHG | HEART RATE: 60 BPM | WEIGHT: 175.8 LBS

## 2023-01-12 DIAGNOSIS — R87.618 PAP SMEAR ABNORMALITY OF CERVIX/HUMAN PAPILLOMAVIRUS (HPV) POSITIVE: Primary | ICD-10-CM

## 2023-01-12 LAB — SL AMB POCT URINE HCG: NEGATIVE

## 2023-01-15 NOTE — PROGRESS NOTES
[address] [place] [address]  PROCEDURE VISIT    SUBJECTIVE:  HPI: Sidney Shields is a 40 y o  [id number] female who presents for scheduled colposcopy in the setting of abnormal Pap smear performed at annual exam on 22  Pap history:     2017: NILM/HPV neg   22: NILM/HPV 16+     She was counseled to return for colposcopy  She is not a smoker  She is not sure if she has received the HPV vaccine in the past   She is currently sexually active  Patient has IUD, and desires permanent sterilization moving forward  She is scheduled for her surgery  History reviewed  No pertinent past medical history  OB History    Para Term  AB Living   4 2 2 0 1 2   SAB IAB Ectopic Multiple Live Births   1 0 0 0 1      # Outcome Date GA Lbr Raf/2nd Weight Sex Delivery Anes PTL Lv   4             3 Term 14 42w0d  4082 g (9 lb) M Vag-Spont EPI     2 Term 11 38w0d  3317 g (7 lb 5 oz) F Vag-Spont EPI  THEODORE   1 SAB                Social History     Tobacco Use   • Smoking status: Never   • Smokeless tobacco: Never   Substance Use Topics   • Alcohol use: No   • Drug use: No       OBJECTIVE:  Vitals:    23 1517   BP: 122/78   Pulse: 60   Weight: 79 7 kg (175 lb 12 8 oz)   Height: 5' 5" (1 651 m)         Physical Exam  Vitals reviewed  Constitutional:       General: She is not in acute distress  Appearance: She is well-developed and normal weight  She is not ill-appearing  HENT:      Head: Normocephalic and atraumatic  Eyes:      Conjunctiva/sclera: Conjunctivae normal    Cardiovascular:      Rate and Rhythm: Normal rate  Pulmonary:      Effort: Pulmonary effort is normal    Abdominal:      General: There is no distension  Palpations: Abdomen is soft  Tenderness: There is no abdominal tenderness  Genitourinary:     Comments: Normal appearing external genitalia, vaginal mucosa, and cervix, with no evidence of mass, lesion, or injury   No evidence of vaginal bleeding  Normal physiologic discharge in the vaginal vault  Musculoskeletal:         General: Normal range of motion  Skin:     General: Skin is warm and dry  Neurological:      General: No focal deficit present  Mental Status: She is alert and oriented to person, place, and time  Mental status is at baseline  Psychiatric:         Mood and Affect: Mood normal          Behavior: Behavior normal          Thought Content: Thought content normal        Recent Results (from the past 72 hour(s))   POCT urine HCG    Collection Time: 01/12/23  5:44 PM   Result Value Ref Range    URINE HCG Negative         Colposcopy     Date/Time 1/14/2023 7:54 PM     [place] Protocol   Procedure performed by: (Dr Nichole Kat)  Consent: Verbal consent obtained  Written consent obtained    Risks and benefits: risks, benefits and alternatives were discussed  Patient understanding: patient states understanding of the procedure being performed  Patient consent: the patient's understanding of the procedure matches consent given  Procedure consent: procedure consent matches procedure scheduled  Relevant documents: relevant documents present and verified  Test results: test results available and properly labeled  Required items: required blood products, implants, devices, and special equipment available  Patient identity confirmed: verbally with patient       Performed by  Yari Floyd MD     Authorized by Yari Floyd MD        Pre-procedure details     Prepped with: acetic acid       Indication    Indications: HPV 16+     Procedure Details   Procedure: Colposcopy w/ cervical biopsy and ECC      Under satisfactory analgesia the patient was prepped and draped in the dorsal lithotomy position: yes      [place] speculum was placed in the vagina: yes      Under colposcopic examination the transition zone was seen in entirety: yes      Intracervical block was performed: yes      Endocervix was curetted using a Gerryian curette: yes      Cervical biopsy performed with a cervical biopsy punch: yes      Tampon inserted: no      Monsel's solution was applied: yes      Biopsy(s): yes      Location:  6 o'clock, 11 o'clock    Specimen to pathology: yes       Post-procedure      Findings: Bleeding and White epithelium      Impression: Low grade cervical dysplasia      Patient tolerance of procedure: Tolerated well, no immediate complications     Comments       White patchy areas seen at 6 o'clock and 11 o'clock  Biopsies were taken  Biopsy sites initially noted to have mildly brisk bleeding  These sites were made hemostatic with application of pressure, Monsel's solution, and silver nitrate  Patient tolerated the procedure well  Patient waited 20-30 min in exam room after procedure  No further bleeding was noted  ASSESSMENT:  Juana Vasquez is a 40 y o  [id number] female who presents for colposcopy in the setting of HPV 16+ Pap smear  Colposcopy performed without immediate complications  PLAN:  - RTC 2 weeks to discuss results   - Nothing per vagina for 1-2 weeks to allow biopsy sites to heal  - HPV vaccine: 1st dose of series administered today  RTC 1-2 months and 6 months for second and third doses  Dr Rina Rosa was present for procedure        Marcelo Champagne MD   PGY-4, OBGYN  01/14/23

## 2023-01-25 NOTE — PROGRESS NOTES
[address] [place] [address]  H&P EXAM    SUBJECTIVE:  CC: discuss results   [facility]  Brandon Mac [id number] used for [place] translation  HPI: Sherly Colon is a 40 y o  [id number] female who presents to discuss colposcopy [facility]  Cervical cancer screening history as follows:      8/14/2017: NILM/HPV neg   12/22/22: NILM/HPV 16+   1/12/23: Colposcopy:  "Final Diagnosis  A  Cervix, Endocervical, ECC:  - Scant benign endocervical tissue       B  Cervix, 11:  - Superficial and detached squamous epithelium with mild cytologic atypia       C  Cervix, 6:  - Low grade squamous intraepithelial lesion (LSIL)/(SUSAN-1) "    Patient tolerated her procedure well  Sates she has had intermittent spotting since the procedure  Occasionally feels dizzy and lighteheaded  She is not a smoker  She received the first dose of the HPV vaccine at the last visit  She is currently sexually active  Patient currently has a Mirena IUD for contraception, and is scheduled for permanent sterilization on 2/17/23 at [address] [place] [address]  However, today patient states she wants to cancel her tubal surgery  She spoke with her partner, and they have decided that if she is healthy, they would like to have another child  Her last baby was born 6 years ago  When explaining what led to her decision, states "I want it to be as whatever God desires "     History reviewed  No pertinent past medical history  Social History     Tobacco Use   • Smoking status: Never   • Smokeless tobacco: Never   Substance Use Topics   • Alcohol use: No   • Drug use: No       OBJECTIVE:  Vitals:    01/26/23 1324   BP: 129/86   Pulse: 66   Weight: 79 8 kg (176 lb)       Physical Exam    Iud removal    Date/Time: 1/26/2023 1:54 PM  Performed by: Tony Lopes MD  Authorized by: Tony Lopes MD   Universal Protocol:  Consent: Verbal consent obtained    Risks and benefits: risks, benefits and alternatives were discussed  Consent given by: patient  Patient understanding: patient states understanding of the procedure being performed  Required items: required blood products, implants, devices, and special equipment available  Patient identity confirmed: verbally with patient      Procedure:     Removed with no complications: yes      Removal due to mechanical complications of IUD: no      Removal due to infection and inflammatory reaction: no      Other reason for removal:  Patient desires pregnancy  Comments:      IUD removed whole and intact  ASSESSMENT:  Sobia Zarate is a 40 y o  [id number] female who presents to discuss colposcopy results  Biopsy at 6 o'clock showed SUSAN I  Histology is likely the manifestation of + HPV infection  Given low grade histology, will plan to repeat Pap smear with HPV co-testing in 1 year  Patient verbalized understanding  Patient states she is 100% sure she desires to conceive going forward and wanted her IUD out today  IUD removed whole an intact without complication, with both strings attached  Discussed she will have return of menses  We also discussed risks of a pregnancy at this advanced maternal age, including increased risk of aneuploidy, preeclampsia, diabetes, and other complications in pregnancy  Patient verbalized understanding  She is however otherwise healthy at this time without major medical problems  She has had two prior vaginal deliveries  PLAN:  - Repeat Pap smear in 1 year     - Return for 2nd and 3rd doses of HPV infection  - Start prenatal vitamin   - RTC when patient has positive pregnancy test       Woody Sanchez MD   PGY-4, OBGYN  01/26/23

## 2023-01-26 ENCOUNTER — OFFICE VISIT (OUTPATIENT)
Dept: OBGYN CLINIC | Facility: CLINIC | Age: 38
End: 2023-01-26

## 2023-01-26 VITALS
BODY MASS INDEX: 29.29 KG/M2 | SYSTOLIC BLOOD PRESSURE: 129 MMHG | HEART RATE: 66 BPM | DIASTOLIC BLOOD PRESSURE: 86 MMHG | WEIGHT: 176 LBS

## 2023-01-26 DIAGNOSIS — Z31.9 PATIENT DESIRES PREGNANCY: Primary | ICD-10-CM

## 2023-01-31 ENCOUNTER — TELEPHONE (OUTPATIENT)
Dept: OBGYN CLINIC | Facility: CLINIC | Age: 38
End: 2023-01-31

## 2023-01-31 DIAGNOSIS — Z30.011 ENCOUNTER FOR INITIAL PRESCRIPTION OF CONTRACEPTIVE PILLS: Primary | ICD-10-CM

## 2023-01-31 RX ORDER — NORETHINDRONE ACETATE AND ETHINYL ESTRADIOL 1; .02 MG/1; MG/1
1 TABLET ORAL DAILY
Qty: 84 TABLET | Refills: 3 | Status: SHIPPED | OUTPATIENT
Start: 2023-01-31

## 2023-02-07 ENCOUNTER — OFFICE VISIT (OUTPATIENT)
Dept: OBGYN CLINIC | Facility: CLINIC | Age: 38
End: 2023-02-07

## 2023-02-07 VITALS
DIASTOLIC BLOOD PRESSURE: 71 MMHG | HEIGHT: 65 IN | SYSTOLIC BLOOD PRESSURE: 106 MMHG | WEIGHT: 174.2 LBS | HEART RATE: 64 BPM | BODY MASS INDEX: 29.02 KG/M2

## 2023-02-07 DIAGNOSIS — N93.9 ABNORMAL UTERINE BLEEDING (AUB): Primary | ICD-10-CM

## 2023-02-07 NOTE — PATIENT INSTRUCTIONS
Curtis por nelson confianza en nuestro equipo  Jolena Sinton y agradecemos renate comentarios  Si recibe pablo encuesta nuestra, tómese unos momentos para informarnos cómo estamos     Sinceramente,  Aldridge Apt, DO

## 2023-02-07 NOTE — PROGRESS NOTES
PROBLEM GYNECOLOGICAL VISIT    Anitha Sweeney is a 41 y o  female who presents today with complaint of passing a large blood clot  Her general medical history has been reviewed and she reports it as follows:    No past medical history on file  Past Surgical History:   Procedure Laterality Date   • BREAST IMPLANT     • CARDIAC CATHETERIZATION       OB History        4    Para   2    Term   2       0    AB   1    Living   2       SAB   1    IAB   0    Ectopic   0    Multiple   0    Live Births   1               Social History     Tobacco Use   • Smoking status: Never   • Smokeless tobacco: Never   Substance Use Topics   • Alcohol use: No   • Drug use: No     Social History     Substance and Sexual Activity   Sexual Activity Yes   • Partners: Male    Comment: 10/2014 PARAGARD IUD       Current Outpatient Medications   Medication Instructions   • diclofenac sodium (VOLTAREN) 50 mg EC tablet TAKE 1 TABLET BY MOUTH TWICE A DAY   • ergocalciferol (VITAMIN D2) 50,000 Units, Oral, Weekly   • ferrous sulfate 325 mg, Oral, Daily   • metoclopramide (REGLAN) 10 mg, Oral, Every 6 hours PRN   • norethindrone-ethinyl estradiol (Junel ) 1-20 MG-MCG per tablet 1 tablet, Oral, Daily   • Prenat w/o A-FeCbGl-DSS-FA-DHA (PNV OB+DHA) 27-1 & 250 MG MISC 1 tablet, Oral, Daily       History of Present Illness:   Patient presents with c/o passing a large blood clot presumed that she is having a miscarriage and requesting to get another date for sterilization  Review of Systems:  Review of Systems   Genitourinary: Positive for menstrual problem  AUB   All other systems reviewed and are negative  Physical Exam:  /71   Pulse 64   Ht 5' 5" (1 651 m)   Wt 79 kg (174 lb 3 2 oz)   LMP  (LMP Unknown)   BMI 28 99 kg/m²   Physical Exam  Constitutional:       Appearance: Normal appearance  Neurological:      Mental Status: She is alert  Vitals reviewed         Discussion:  Discuss with patient urine pregnancy test negative  Recommend a pelvic sonogram for further evaluation of the AUB which she had in the past and now reoccurring while on OCP  After evaluation will than schedule for either a tubal ligation vs hysterectomy  Assessment:   1  AUB    Plan:   1  Urine pregnancy test   2  Imaging ordered: pelvic   3  Return to office 3wks  Reviewed with patient that test results are available in MyChart immediately, but that they will not necessarily be reviewed by me immediately  Explained that I will review results at my earliest opportunity and contact patient appropriately

## 2023-02-16 ENCOUNTER — HOSPITAL ENCOUNTER (OUTPATIENT)
Dept: ULTRASOUND IMAGING | Facility: HOSPITAL | Age: 38
End: 2023-02-16

## 2023-02-16 DIAGNOSIS — N93.9 ABNORMAL UTERINE BLEEDING (AUB): ICD-10-CM

## 2023-02-27 ENCOUNTER — CLINICAL SUPPORT (OUTPATIENT)
Dept: OBGYN CLINIC | Facility: CLINIC | Age: 38
End: 2023-02-27

## 2023-02-27 VITALS
SYSTOLIC BLOOD PRESSURE: 109 MMHG | HEIGHT: 65 IN | WEIGHT: 174.6 LBS | HEART RATE: 65 BPM | BODY MASS INDEX: 29.09 KG/M2 | DIASTOLIC BLOOD PRESSURE: 75 MMHG

## 2023-02-27 DIAGNOSIS — R87.618 PAP SMEAR ABNORMALITY OF CERVIX/HUMAN PAPILLOMAVIRUS (HPV) POSITIVE: Primary | ICD-10-CM

## 2023-02-27 NOTE — PROGRESS NOTES
Pt here for second dose of Gardasil  Given in left arm       Esa Michaels 48 - [id number]  LOT - [id number]  EXP - 12/11/2024

## 2023-03-09 ENCOUNTER — OFFICE VISIT (OUTPATIENT)
Dept: OBGYN CLINIC | Facility: CLINIC | Age: 38
End: 2023-03-09

## 2023-03-09 VITALS
WEIGHT: 175.2 LBS | HEART RATE: 64 BPM | SYSTOLIC BLOOD PRESSURE: 113 MMHG | HEIGHT: 65 IN | DIASTOLIC BLOOD PRESSURE: 76 MMHG | BODY MASS INDEX: 29.19 KG/M2

## 2023-03-09 DIAGNOSIS — Z71.2 ENCOUNTER TO DISCUSS TEST RESULTS: Primary | ICD-10-CM

## 2023-03-09 NOTE — PROGRESS NOTES
PROBLEM GYNECOLOGICAL VISIT    Abelino Joseph is a 41 y o  female who presents today with complaint of for followup  Her general medical history has been reviewed and she reports it as follows:    No past medical history on file  Past Surgical History:   Procedure Laterality Date   • BREAST IMPLANT     • CARDIAC CATHETERIZATION       OB History        4    Para   2    Term   2       0    AB   1    Living   2       SAB   1    IAB   0    Ectopic   0    Multiple   0    Live Births   1               Social History     Tobacco Use   • Smoking status: Never   • Smokeless tobacco: Never   Substance Use Topics   • Alcohol use: No   • Drug use: No     Social History     Substance and Sexual Activity   Sexual Activity Yes   • Partners: Male   • Birth control/protection: Pill       Current Outpatient Medications   Medication Instructions   • diclofenac sodium (VOLTAREN) 50 mg EC tablet TAKE 1 TABLET BY MOUTH TWICE A DAY   • ergocalciferol (VITAMIN D2) 50,000 Units, Oral, Weekly   • ferrous sulfate 325 mg, Oral, Daily   • metoclopramide (REGLAN) 10 mg, Oral, Every 6 hours PRN   • norethindrone-ethinyl estradiol (Junel ) 1-20 MG-MCG per tablet 1 tablet, Oral, Daily   • Prenat w/o A-FeCbGl-DSS-FA-DHA (PNV OB+DHA) 27-1 & 250 MG MISC 1 tablet, Oral, Daily       History of Present Illness:   Patient presents for followup s/p pelvic sonogram for AUB with no complaints  Patient states that bleeding has resolved  Review of Systems:  Review of Systems   All other systems reviewed and are negative  Physical Exam:  /76   Pulse 64   Ht 5' 5" (1 651 m)   Wt 79 5 kg (175 lb 3 2 oz)   LMP 2023 (Approximate)   BMI 29 15 kg/m²   Physical Exam  Constitutional:       Appearance: Normal appearance  Neurological:      Mental Status: She is alert  Vitals reviewed       Discussion:  Discuss with patient pelvic sonogram consist of bilateral ovarian follicles otherwise normal findings  Assessment:   1  Resolution of AUB    Plan:     1  Return to office prn    2  Patient's depression screening was assessed with a PHQ-2 score of 2  Their PHQ-9 score was 5  Clinically patient does not have depression  No treatment is required  Reviewed with patient that [facility], but that they will not necessarily be reviewed by me immediately  Explained that I will review results at my earliest opportunity and contact patient appropriately  170.18

## 2023-03-09 NOTE — PATIENT INSTRUCTIONS
Curtis por nelson confianza en nuestro equipo  [facility] y agradecemos renate comentarios  Si recibe pablo encuesta nuestra, tómese unos momentos para informarnos cómo estamos     Sinceramente,  [facility], DO

## 2023-04-21 ENCOUNTER — TELEPHONE (OUTPATIENT)
Dept: OBGYN CLINIC | Facility: CLINIC | Age: 38
End: 2023-04-21

## 2023-04-26 NOTE — TELEPHONE ENCOUNTER
Called PT and left voice message to contact our office to make an appointment for GYN pregnancy test

## 2023-04-27 ENCOUNTER — OFFICE VISIT (OUTPATIENT)
Dept: OBGYN CLINIC | Facility: CLINIC | Age: 38
End: 2023-04-27

## 2023-04-27 ENCOUNTER — APPOINTMENT (OUTPATIENT)
Dept: LAB | Facility: CLINIC | Age: 38
End: 2023-04-27

## 2023-04-27 ENCOUNTER — OFFICE VISIT (OUTPATIENT)
Dept: FAMILY MEDICINE CLINIC | Facility: CLINIC | Age: 38
End: 2023-04-27

## 2023-04-27 VITALS
HEIGHT: 65 IN | OXYGEN SATURATION: 99 % | BODY MASS INDEX: 29.12 KG/M2 | DIASTOLIC BLOOD PRESSURE: 80 MMHG | TEMPERATURE: 97.3 F | SYSTOLIC BLOOD PRESSURE: 112 MMHG | HEART RATE: 62 BPM | WEIGHT: 174.8 LBS | RESPIRATION RATE: 16 BRPM

## 2023-04-27 VITALS
SYSTOLIC BLOOD PRESSURE: 136 MMHG | DIASTOLIC BLOOD PRESSURE: 83 MMHG | WEIGHT: 175 LBS | BODY MASS INDEX: 29.16 KG/M2 | HEIGHT: 65 IN | HEART RATE: 63 BPM

## 2023-04-27 DIAGNOSIS — E55.9 VITAMIN D DEFICIENCY: Primary | ICD-10-CM

## 2023-04-27 DIAGNOSIS — R53.83 FATIGUE, UNSPECIFIED TYPE: ICD-10-CM

## 2023-04-27 DIAGNOSIS — N92.6 MISSED MENSES: Primary | ICD-10-CM

## 2023-04-27 DIAGNOSIS — E55.9 VITAMIN D DEFICIENCY: ICD-10-CM

## 2023-04-27 PROBLEM — N91.2 AMENORRHEA: Status: RESOLVED | Noted: 2019-01-22 | Resolved: 2023-04-27

## 2023-04-27 PROBLEM — D50.9 IRON DEFICIENCY ANEMIA: Status: RESOLVED | Noted: 2018-11-07 | Resolved: 2023-04-27

## 2023-04-27 PROBLEM — M54.50 CHRONIC BILATERAL LOW BACK PAIN WITHOUT SCIATICA: Status: RESOLVED | Noted: 2018-08-28 | Resolved: 2023-04-27

## 2023-04-27 PROBLEM — E66.3 OVERWEIGHT (BMI 25.0-29.9): Status: RESOLVED | Noted: 2022-03-10 | Resolved: 2023-04-27

## 2023-04-27 PROBLEM — G89.29 CHRONIC BILATERAL LOW BACK PAIN WITHOUT SCIATICA: Status: RESOLVED | Noted: 2018-08-28 | Resolved: 2023-04-27

## 2023-04-27 LAB
25(OH)D3 SERPL-MCNC: 23.4 NG/ML (ref 30–100)
ANION GAP SERPL CALCULATED.3IONS-SCNC: 2 MMOL/L (ref 4–13)
BASOPHILS # BLD AUTO: 0.07 THOUSANDS/ΜL (ref 0–0.1)
BASOPHILS NFR BLD AUTO: 1 % (ref 0–1)
BUN SERPL-MCNC: 10 MG/DL (ref 5–25)
CALCIUM SERPL-MCNC: 9.8 MG/DL (ref 8.3–10.1)
CHLORIDE SERPL-SCNC: 105 MMOL/L (ref 96–108)
CO2 SERPL-SCNC: 30 MMOL/L (ref 21–32)
CREAT SERPL-MCNC: 0.66 MG/DL (ref 0.6–1.3)
EOSINOPHIL # BLD AUTO: 0.25 THOUSAND/ΜL (ref 0–0.61)
EOSINOPHIL NFR BLD AUTO: 5 % (ref 0–6)
ERYTHROCYTE [DISTWIDTH] IN BLOOD BY AUTOMATED COUNT: 11.5 % (ref 11.6–15.1)
GFR SERPL CREATININE-BSD FRML MDRD: 113 ML/MIN/1.73SQ M
GLUCOSE P FAST SERPL-MCNC: 73 MG/DL (ref 65–99)
HCT VFR BLD AUTO: 41.8 % (ref 34.8–46.1)
HGB BLD-MCNC: 13.6 G/DL (ref 11.5–15.4)
IMM GRANULOCYTES # BLD AUTO: 0.01 THOUSAND/UL (ref 0–0.2)
IMM GRANULOCYTES NFR BLD AUTO: 0 % (ref 0–2)
LYMPHOCYTES # BLD AUTO: 2.13 THOUSANDS/ΜL (ref 0.6–4.47)
LYMPHOCYTES NFR BLD AUTO: 44 % (ref 14–44)
MCH RBC QN AUTO: 30.7 PG (ref 26.8–34.3)
MCHC RBC AUTO-ENTMCNC: 32.5 G/DL (ref 31.4–37.4)
MCV RBC AUTO: 94 FL (ref 82–98)
MONOCYTES # BLD AUTO: 0.46 THOUSAND/ΜL (ref 0.17–1.22)
MONOCYTES NFR BLD AUTO: 9 % (ref 4–12)
NEUTROPHILS # BLD AUTO: 2.04 THOUSANDS/ΜL (ref 1.85–7.62)
NEUTS SEG NFR BLD AUTO: 41 % (ref 43–75)
NRBC BLD AUTO-RTO: 0 /100 WBCS
PLATELET # BLD AUTO: 392 THOUSANDS/UL (ref 149–390)
PMV BLD AUTO: 10.5 FL (ref 8.9–12.7)
POTASSIUM SERPL-SCNC: 3.8 MMOL/L (ref 3.5–5.3)
RBC # BLD AUTO: 4.43 MILLION/UL (ref 3.81–5.12)
SL AMB POCT URINE HCG: NEGATIVE
SODIUM SERPL-SCNC: 137 MMOL/L (ref 135–147)
TSH SERPL DL<=0.05 MIU/L-ACNC: 0.65 UIU/ML (ref 0.45–4.5)
VIT B12 SERPL-MCNC: 477 PG/ML (ref 100–900)
WBC # BLD AUTO: 4.96 THOUSAND/UL (ref 4.31–10.16)

## 2023-04-27 NOTE — PROGRESS NOTES
"PROBLEM GYNECOLOGICAL VISIT    Claudetta Minors is a 41 y o  female who presents today with complaint of missed menses  Her general medical history has been reviewed and she reports it as follows:    History reviewed  No pertinent past medical history  Past Surgical History:   Procedure Laterality Date   • BREAST IMPLANT     • CARDIAC CATHETERIZATION       OB History        3    Para   2    Term   2       0    AB   1    Living   2       SAB   1    IAB   0    Ectopic   0    Multiple   0    Live Births   2               Social History     Tobacco Use   • Smoking status: Never   • Smokeless tobacco: Never   Vaping Use   • Vaping Use: Never used   Substance Use Topics   • Alcohol use: Not Currently   • Drug use: Never     Social History     Substance and Sexual Activity   Sexual Activity Yes   • Partners: Male   • Birth control/protection: OCP       Current Outpatient Medications   Medication Instructions   • norethindrone-ethinyl estradiol (Junel 1/20) 1-20 MG-MCG per tablet 1 tablet, Oral, Daily       History of Present Illness:   Reports started OCP's in 2023 and since then has had some AUB and now LMP is 2023  She had negative pregnancy tests at home and has continued taking her daily OCP  Review of Systems:  Review of Systems   Constitutional: Negative  Gastrointestinal: Negative  Genitourinary: Positive for menstrual problem  Negative for vaginal bleeding  Physical Exam:  /83   Pulse 63   Ht 5' 5\" (1 651 m)   Wt 79 4 kg (175 lb)   LMP 2023 (Approximate)   BMI 29 12 kg/m²   Physical Exam  Constitutional:       General: She is not in acute distress  Neurological:      Mental Status: She is alert  Skin:     General: Skin is warm and dry  Vitals reviewed  Point of Care Testing:   -urine pregnancy test: negative    Assessment:   1  Missed menses  Plan:   1  Advised continue with OCP's    2  Return to office as previously scheduled     3  Patient's " depression screening was assessed with a PHQ-2 score of 2  Their PHQ-9 score was 6  Clinically patient does not have depression  No treatment is required

## 2023-04-27 NOTE — PATIENT INSTRUCTIONS
Curtis por nelson confianza en nuestro equipo  Mylene Mackay y agradecemos renate comentarios  Si recibe pablo encuesta nuestra, tómese unos momentos para informarnos cómo estamos     Sinceramente,  [facility]

## 2023-04-27 NOTE — PROGRESS NOTES
"Name: Dyana Quiles      : 1985      MRN: [id number]  Encounter Provider: Jacqueline Giraldo MD  Encounter Date: 2023   Encounter department: [address] [place] [address]     1  Vitamin D deficiency  -     CBC and differential; Future  -     Vitamin B12; Future  -     Vitamin D 25 hydroxy; Future  -     TSH, 3rd generation with Free T4 reflex; Future    2  Fatigue, unspecified type  -     CBC and differential; Future  -     Vitamin B12; Future  -     Vitamin D 25 hydroxy; Future  -     TSH, 3rd generation with Free T4 reflex; Future  -     Basic metabolic panel; Future         Subjective      42 yo  female complains of 2 week on and off headache  Describes it has having a heavy hand push the side of her head  Accompanied by lightheaded  Denies photo or phonophobia, nausea, vomiting  Pain does not wake her up from sleep  Has not checked eye sight in several years  Irregular menses since starting OCP     Review of Systems   Neurological: Positive for light-headedness and headaches  All other systems reviewed and are negative  Current Outpatient Medications on File Prior to Visit   Medication Sig    norethindrone-ethinyl estradiol (Junel 1/20) 1-20 MG-MCG per tablet Take 1 tablet by mouth daily       Objective     /80 (BP Location: Right arm, Patient Position: Sitting, Cuff Size: Standard)   Pulse 62   Temp (!) 97 3 °F (36 3 °C) (Temporal)   Resp 16   Ht 5' 5\" (1 651 m)   Wt 79 3 kg (174 lb 12 8 oz)   LMP  (LMP Unknown) Comment: per pt after starting birth control pills MC became irregular  SpO2 99%   BMI 29 09 kg/m²     Physical Exam  Vitals and nursing note reviewed  Constitutional:       Appearance: She is well-developed  HENT:      Head: Normocephalic        Right Ear: External ear normal       Left Ear: External ear normal       Nose: Nose normal    Eyes:      Conjunctiva/sclera: Conjunctivae normal       Pupils: Pupils are equal, " round, and reactive to light  Neck:      Thyroid: No thyromegaly  Cardiovascular:      Rate and Rhythm: Normal rate and regular rhythm  Heart sounds: Normal heart sounds  Pulmonary:      Effort: Pulmonary effort is normal       Breath sounds: Normal breath sounds  Abdominal:      Palpations: Abdomen is soft  Tenderness: There is no abdominal tenderness  There is no guarding or rebound  Musculoskeletal:         General: Normal range of motion  Cervical back: Normal range of motion and neck supple  Skin:     General: Skin is dry  Neurological:      Mental Status: She is alert and oriented to person, place, and time  Deep Tendon Reflexes: Reflexes are normal and symmetric         Matt Rocha MD

## 2023-05-01 DIAGNOSIS — E55.9 VITAMIN D DEFICIENCY: Primary | ICD-10-CM

## 2023-05-01 RX ORDER — ERGOCALCIFEROL 1.25 MG/1
50000 CAPSULE ORAL WEEKLY
Qty: 12 CAPSULE | Refills: 1 | Status: SHIPPED | OUTPATIENT
Start: 2023-05-01

## 2023-06-01 ENCOUNTER — OFFICE VISIT (OUTPATIENT)
Dept: OBGYN CLINIC | Facility: CLINIC | Age: 38
End: 2023-06-01

## 2023-06-01 VITALS
HEIGHT: 65 IN | HEART RATE: 76 BPM | BODY MASS INDEX: 29.46 KG/M2 | WEIGHT: 176.8 LBS | SYSTOLIC BLOOD PRESSURE: 119 MMHG | DIASTOLIC BLOOD PRESSURE: 78 MMHG

## 2023-06-01 DIAGNOSIS — Z30.2 REQUEST FOR STERILIZATION: Primary | ICD-10-CM

## 2023-06-01 DIAGNOSIS — Z01.818 PREOP EXAMINATION: ICD-10-CM

## 2023-06-01 NOTE — PROGRESS NOTES
H&P Exam  Latosha Terry 41 y o  female MRN: [id number]  Unit/Bed#:  Encounter: [id number]      HPI:  Latosha Terry is a 41 y o  [id number] female who will be undergoing bilateral salpingectomy on 23  Meng Solis reports no history of abdominal surgery in the past  She reports at this time the only medications she takes are Jermaine Harp for contraception and her Vitamin D supplement  We discussed the planned laparoscopic approached, expected recovery time and preop insturctions regarding hibiclens  Consent signed 2023  MA-31 signed 2022  LMP: 2023  Contraception: Jermaine Harp   Last pap smear: 2022 NILM, HPV 16 positive   Colpo 23 - LSIL/SUSAN I at 6 o'clock    Blood use during admission if needed: yes   CPR during admission if needed: yes     OB History    Para Term  AB Living   3 2 2 0 1 2   SAB IAB Ectopic Multiple Live Births   1 0 0 0 2      # Outcome Date GA Lbr Raf/2nd Weight Sex Delivery Anes PTL Lv   3 Term 14 42w0d  4082 g (9 lb) M Vag-Spont EPI     2 Term 11 38w0d  3317 g (7 lb 5 oz) F Vag-Spont EPI  THEODORE   1 SAB                Review of Systems   Constitutional: Negative  HENT: Negative  Eyes: Negative  Respiratory: Negative  Cardiovascular: Negative  Gastrointestinal: Negative  Endocrine: Negative  Genitourinary: Negative  Musculoskeletal: Negative  Allergic/Immunologic: Negative  Neurological: Negative  Hematological: Negative  Psychiatric/Behavioral: Negative  Historical Information   No past medical history on file    Past Surgical History:   Procedure Laterality Date   • BREAST IMPLANT     • CARDIAC CATHETERIZATION       Social History   Social History     Substance and Sexual Activity   Alcohol Use Not Currently     Social History     Substance and Sexual Activity   Drug Use Never     Social History     Tobacco Use   Smoking Status Never   Smokeless Tobacco Never       Meds/Allergies    (Not in a hospital admission)     No Known Allergies    OBJECTIVE:    Vitals: unknown if currently breastfeeding  There is no height or weight on file to calculate BMI  Physical Exam  Constitutional:       General: She is not in acute distress  Appearance: Normal appearance  HENT:      Head: Normocephalic and atraumatic  Cardiovascular:      Rate and Rhythm: Normal rate and regular rhythm  Pulmonary:      Effort: Pulmonary effort is normal       Breath sounds: Normal breath sounds  Abdominal:      Palpations: Abdomen is soft  Tenderness: There is no abdominal tenderness  Musculoskeletal:      Right lower leg: No tenderness  No edema  Left lower leg: No tenderness  No edema  Skin:     General: Skin is warm and dry  Neurological:      General: No focal deficit present  Mental Status: She is alert  Assessment/Plan     ASSESSMENT:  43yo  female who is scheduled for bilateral laparoscopic salpingectomy on 23      PLAN:  NPO after midnight, Hibiclens given to patient       Moi Han MD

## 2023-06-01 NOTE — H&P (VIEW-ONLY)
H&P Exam  Corine Gray 41 y o  female MRN: [id number]  Unit/Bed#:  Encounter: [id number]      HPI:  Corine Gray is a 41 y o  [id number] female who will be undergoing bilateral salpingectomy on 23  [facility] reports no history of abdominal surgery in the past  She reports at this time the only medications she takes are [place] for contraception and her Vitamin D supplement  We discussed the planned laparoscopic approached, expected recovery time and preop insturctions regarding hibiclens  Consent signed 2023  MA-31 signed 2022  LMP: 2023  Contraception: [place] and [employer]   Last pap smear: 2022 NILM, HPV 16 positive   Colpo 23 - LSIL/SUSAN I at 6 o'clock    Blood use during admission if needed: yes   CPR during admission if needed: yes     OB History    Para Term  AB Living   3 2 2 0 1 2   SAB IAB Ectopic Multiple Live Births   1 0 0 0 2      # Outcome Date GA Lbr Raf/2nd Weight Sex Delivery Anes PTL Lv   3 Term 14 42w0d  4082 g (9 lb) M Vag-Spont EPI     2 Term 11 38w0d  3317 g (7 lb 5 oz) F Vag-Spont EPI  THEODORE   1 SAB                Review of Systems   Constitutional: Negative  HENT: Negative  Eyes: Negative  Respiratory: Negative  Cardiovascular: Negative  Gastrointestinal: Negative  Endocrine: Negative  Genitourinary: Negative  Musculoskeletal: Negative  Allergic/Immunologic: Negative  Neurological: Negative  Hematological: Negative  Psychiatric/Behavioral: Negative  Historical Information   No past medical history on file    Past Surgical History:   Procedure Laterality Date   • BREAST IMPLANT     • CARDIAC CATHETERIZATION       Social History   Social History     Substance and Sexual Activity   Alcohol Use Not Currently     Social History     Substance and Sexual Activity   Drug Use Never     Social History     Tobacco Use   Smoking Status Never   Smokeless Tobacco Never       Meds/Allergies    (Not in a hospital admission)     No Known Allergies    OBJECTIVE:    Vitals: unknown if currently breastfeeding  There is no height or weight on file to calculate BMI  Physical Exam  Constitutional:       General: She is not in acute distress  Appearance: Normal appearance  HENT:      Head: Normocephalic and atraumatic  Cardiovascular:      Rate and Rhythm: Normal rate and regular rhythm  Pulmonary:      Effort: Pulmonary effort is normal       Breath sounds: Normal breath sounds  Abdominal:      Palpations: Abdomen is soft  Tenderness: There is no abdominal tenderness  Musculoskeletal:      Right lower leg: No tenderness  No edema  Left lower leg: No tenderness  No edema  Skin:     General: Skin is warm and dry  Neurological:      General: No focal deficit present  Mental Status: She is alert  Assessment/Plan     ASSESSMENT:  41yo  female who is scheduled for bilateral laparoscopic salpingectomy on 23      PLAN:  NPO after midnight, Hibiclens given to patient       Isaías Reilly MD

## 2023-06-12 NOTE — PRE-PROCEDURE INSTRUCTIONS
Pre-Surgery Instructions:   Medication Instructions   • ergocalciferol (VITAMIN D2) 50,000 units Hold day of surgery  • norethindrone-ethinyl estradiol (Junel 1/20) 1-20 MG-MCG per tablet Take night before surgery    Medication instructions for day surgery reviewed  Please use only a sip of water to take your instructed medications  Avoid all over the counter vitamins, supplements and NSAIDS for one week prior to surgery per anesthesia guidelines  Tylenol is ok to take as needed  You will receive a call one business day prior to surgery with an arrival time and hospital directions  If your surgery is scheduled on a Monday, the hospital will be calling you on the Friday prior to your surgery  If you have not heard from anyone by 8pm, please call the hospital supervisor through the hospital  at [phone]  Amaris iZmmer [phone])  Do not eat or drink anything after midnight the night before your surgery, including candy, mints, lifesavers, or chewing gum  Do not drink alcohol 24hrs before your surgery  Try not to smoke at least 24hrs before your surgery  Follow the pre surgery showering instructions as listed in the [facility] Surgical Experience Booklet” or otherwise provided by your surgeon's office  Do not shave the surgical area 24 hours before surgery  Do not apply any lotions, creams, including makeup, cologne, deodorant, or perfumes after showering on the day of your surgery  No contact lenses, eye make-up, or artificial eyelashes  Remove nail polish, including gel polish, and any artificial, gel, or acrylic nails if possible  Remove all jewelry including rings and body piercing jewelry  Wear causal clothing that is easy to take on and off  Consider your type of surgery  Keep any valuables, jewelry, piercings at home  Please bring any specially ordered equipment (sling, braces) if indicated      Arrange for a responsible person to drive you to and from the hospital on the day of your surgery  Visitor Guidelines discussed  Call the surgeon's office with any new illnesses, exposures, or additional questions prior to surgery  Please reference your [facility] Surgical Experience Booklet” for additional information to prepare for your upcoming surgery

## 2023-06-15 ENCOUNTER — ANESTHESIA EVENT (OUTPATIENT)
Dept: PERIOP | Facility: HOSPITAL | Age: 38
End: 2023-06-15
Payer: COMMERCIAL

## 2023-06-15 PROBLEM — Z30.09 UNWANTED FERTILITY: Status: ACTIVE | Noted: 2023-06-15

## 2023-06-15 RX ORDER — IBUPROFEN 600 MG/1
600 TABLET ORAL EVERY 6 HOURS PRN
Qty: 30 TABLET | Refills: 0
Start: 2023-06-15

## 2023-06-15 RX ORDER — SENNOSIDES 8.6 MG
650 CAPSULE ORAL EVERY 8 HOURS PRN
Qty: 30 TABLET | Refills: 0
Start: 2023-06-15

## 2023-06-15 NOTE — DISCHARGE INSTRUCTIONS
Salpingectomía   LO QUE NECESITA SABER:   Pablo salpingectomía es [place] para remover pablo o ambas trompas de [place]  Las trompas de [place] transportan óvulos de los ovarios al útero  Estas son parte del sistema reproductivo de la pauline  Pablo salpingectomía podría realizarse para tratar un embarazo ectópico, cáncer, endometriosis o pablo infección  También podría realizarse para evitar un embarazo o algunos tipos de cáncer  INSTRUCCIONES SOBRE EL [facility] HOSPITALARIA:   Llame al [facility] (911 en los [place]) en cualquiera de los siguientes casos:  Usted tiene [place], falta de aire y dolor en el pecho  Usted expectora kelley  Usted tiene dificultad para respirar  Busque atención médica de inmediato si:  Nelson brazo o pierna se siente caliente, sensible y [place]  Se podría william inflamado y ortega  La kelley empapa el vendaje  Se desprenden los puntos de sutura  Usted empapa 1 toalla sanitaria en 1 hora  Usted tiene dificultad para orinar o no puede orinar en lo absoluto  Llame a nelson médico o cirujano si:  Usted tiene fiebre o escalofríos  Nelson herida está mahsa, inflamada o drena pus  Nelson vagina supura pus o huele mal     Nelson dolor no desaparece después de que usted se teena el medicamento  Tiene náuseas o está vomitando  Tiene comezón, inflamación o un sarpullido en la piel  Usted tiene preguntas o inquietudes acerca de nelson condición o cuidado  Medicamentos: Es posible que usted necesite alguno de los siguientes:  RAYMUNDO francisca el ibuprofeno, ayudan a disminuir la inflamación, el dolor y la [place]  Los RAYMUNDO pueden causar sangrado estomacal o problemas renales en ciertas personas  Si usted teena un medicamento anticoagulante, siempre pregúntele a nelson médico si los RAYMUNDO son seguros para usted  Siempre verónica la etiqueta de garry medicamento y [place] instrucciones  Puede administrarse podrían administrarse   Pregunte al médico cómo debe farnaz garry medicamento de [place]  Algunos medicamentos recetados para el dolor contienen acetaminofén  No tome otros medicamentos que contengan acetaminofén sin consultarlo con nelson médico  Demasiado acetaminofeno puede causar daño al hígado  Los medicamentos recetados para el dolor podrían causar estreñimiento  Pregunte a nelson médico francisca prevenir o tratar estreñimiento  [place] renate medicamentos francisca se le haya indicado  Consulte con nelson médico si usted [place] que nelson medicamento no le está ayudando o si presenta efectos secundarios  Infórmele al médico si usted es alérgico a algún medicamento  Mantenga pablo lista actualizada de los [place], las vitaminas y los productos herbales que teena  Incluya los siguientes datos de los medicamentos: cantidad, frecuencia y motivo de administración  Traiga con usted la lista o los envases de las píldoras a renate citas de seguimiento  Lleve la lista de los medicamentos con usted en nichelle de pablo emergencia  Siga las instrucciones de nelson médico sobre el cuidado de renate heridas: Pregunte a nelson médico cuándo se puede mojar la herida  No se bañe en la diane hasta que nelson médico se lo autorice  Solo tome [employer]  Lave cuidadosamente el área alrededor de la herida con Loye Flatter y [place]  Deje que Sharyon Sails y el agua corran suavemente sobre nelson incisión  No  frote la incisión  Seque el área y póngale vendajes nuevos y limpios francisca le indicaron  [place] renate vendajes cuando se mojen o ensucien  Si usted tiene tiras de [place] [address], deje que se caigan solas  Actividad: Pregunte a nelson médico cuándo puede retomar ernate actividades habituales  No tome duchas vaginales, ni use tampones o tenga relaciones sexuales hasta que nelson médico se lo autorice  Estas actividades podrían provocarle pablo infección  No rose ejercicio ni levante nada pesado hasta que nelson médico se lo autorice  [place] podría poner demasiada presión en nelson incisión    Acuda a renate consultas de control con nelson médico o cirujano según le indicaron: Anote renate preguntas para que se acuerde de hacerlas vern renate visitas  © Copyright Gabriela Howard 2022 Information is for End User's use only and may not be sold, redistributed or otherwise used for commercial purposes  Esta información es sólo para uso en educación  Nelson intención no es darle un consejo médico sobre enfermedades o tratamientos  Colsulte con nelson Curlene Araceli farmacéutico antes de seguir cualquier régimen médico para saber si es seguro y efectivo para usted

## 2023-06-15 NOTE — ANESTHESIA PREPROCEDURE EVALUATION
Procedure:  SALPINGECTOMY, LAPAROSCOPIC (Bilateral: Uterus)    Relevant Problems   Other   (+) Overweight (BMI 25 0-29 9)   (+) Unwanted fertility        Physical Exam    Airway    Mallampati score: I  TM Distance: >3 FB       Dental   No notable dental hx     Cardiovascular  Cardiovascular exam normal    Pulmonary  Pulmonary exam normal     Other Findings        Anesthesia Plan  ASA Score- 1     Anesthesia Type- general with ASA Monitors  Additional Monitors:   Airway Plan: ETT  Plan Factors-Exercise tolerance (METS): >4 METS  Chart reviewed  Patient is not a current smoker  Obstructive sleep apnea risk education given perioperatively  Induction- intravenous  Postoperative Plan- Plan for postoperative opioid use  Informed Consent- Anesthetic plan and risks discussed with patient

## 2023-06-16 ENCOUNTER — ANESTHESIA (OUTPATIENT)
Dept: PERIOP | Facility: HOSPITAL | Age: 38
End: 2023-06-16
Payer: COMMERCIAL

## 2023-06-16 ENCOUNTER — HOSPITAL ENCOUNTER (OUTPATIENT)
Facility: HOSPITAL | Age: 38
Setting detail: OUTPATIENT SURGERY
Discharge: HOME | End: 2023-06-16
Attending: OBSTETRICS & GYNECOLOGY | Admitting: OBSTETRICS & GYNECOLOGY
Payer: COMMERCIAL

## 2023-06-16 VITALS
HEIGHT: 65 IN | WEIGHT: 175.93 LBS | HEART RATE: 58 BPM | OXYGEN SATURATION: 100 % | SYSTOLIC BLOOD PRESSURE: 104 MMHG | TEMPERATURE: 97.7 F | DIASTOLIC BLOOD PRESSURE: 60 MMHG | RESPIRATION RATE: 16 BRPM | BODY MASS INDEX: 29.31 KG/M2

## 2023-06-16 DIAGNOSIS — Z30.2 STERILIZATION: ICD-10-CM

## 2023-06-16 DIAGNOSIS — G89.18 POST-OP PAIN: Primary | ICD-10-CM

## 2023-06-16 LAB
EXT PREGNANCY TEST URINE: NEGATIVE
EXT. CONTROL: NORMAL

## 2023-06-16 PROCEDURE — 81025 URINE PREGNANCY TEST: CPT | Performed by: OBSTETRICS & GYNECOLOGY

## 2023-06-16 PROCEDURE — 58661 LAPAROSCOPY REMOVE ADNEXA: CPT | Performed by: OBSTETRICS & GYNECOLOGY

## 2023-06-16 PROCEDURE — 88302 TISSUE EXAM BY PATHOLOGIST: CPT | Performed by: PATHOLOGY

## 2023-06-16 RX ORDER — ACETAMINOPHEN 325 MG/1
650 TABLET ORAL EVERY 6 HOURS SCHEDULED
Status: DISCONTINUED | OUTPATIENT
Start: 2023-06-16 | End: 2023-06-16 | Stop reason: HOSPADM

## 2023-06-16 RX ORDER — DEXAMETHASONE SODIUM PHOSPHATE 10 MG/ML
INJECTION, SOLUTION INTRAMUSCULAR; INTRAVENOUS AS NEEDED
Status: DISCONTINUED | OUTPATIENT
Start: 2023-06-16 | End: 2023-06-16

## 2023-06-16 RX ORDER — KETOROLAC TROMETHAMINE 30 MG/ML
INJECTION, SOLUTION INTRAMUSCULAR; INTRAVENOUS AS NEEDED
Status: DISCONTINUED | OUTPATIENT
Start: 2023-06-16 | End: 2023-06-16

## 2023-06-16 RX ORDER — FENTANYL CITRATE/PF 50 MCG/ML
50 SYRINGE (ML) INJECTION
Status: DISCONTINUED | OUTPATIENT
Start: 2023-06-16 | End: 2023-06-16 | Stop reason: HOSPADM

## 2023-06-16 RX ORDER — IBUPROFEN 600 MG/1
600 TABLET ORAL EVERY 6 HOURS PRN
Status: DISCONTINUED | OUTPATIENT
Start: 2023-06-16 | End: 2023-06-16 | Stop reason: HOSPADM

## 2023-06-16 RX ORDER — ONDANSETRON 2 MG/ML
INJECTION INTRAMUSCULAR; INTRAVENOUS AS NEEDED
Status: DISCONTINUED | OUTPATIENT
Start: 2023-06-16 | End: 2023-06-16

## 2023-06-16 RX ORDER — ONDANSETRON 2 MG/ML
4 INJECTION INTRAMUSCULAR; INTRAVENOUS ONCE AS NEEDED
Status: DISCONTINUED | OUTPATIENT
Start: 2023-06-16 | End: 2023-06-16 | Stop reason: HOSPADM

## 2023-06-16 RX ORDER — SODIUM CHLORIDE, SODIUM LACTATE, POTASSIUM CHLORIDE, CALCIUM CHLORIDE 600; 310; 30; 20 MG/100ML; MG/100ML; MG/100ML; MG/100ML
INJECTION, SOLUTION INTRAVENOUS CONTINUOUS PRN
Status: DISCONTINUED | OUTPATIENT
Start: 2023-06-16 | End: 2023-06-16

## 2023-06-16 RX ORDER — LIDOCAINE HYDROCHLORIDE 20 MG/ML
INJECTION, SOLUTION EPIDURAL; INFILTRATION; INTRACAUDAL; PERINEURAL AS NEEDED
Status: DISCONTINUED | OUTPATIENT
Start: 2023-06-16 | End: 2023-06-16

## 2023-06-16 RX ORDER — ONDANSETRON 2 MG/ML
4 INJECTION INTRAMUSCULAR; INTRAVENOUS EVERY 6 HOURS PRN
Status: DISCONTINUED | OUTPATIENT
Start: 2023-06-16 | End: 2023-06-16 | Stop reason: HOSPADM

## 2023-06-16 RX ORDER — FENTANYL CITRATE 50 UG/ML
INJECTION, SOLUTION INTRAMUSCULAR; INTRAVENOUS AS NEEDED
Status: DISCONTINUED | OUTPATIENT
Start: 2023-06-16 | End: 2023-06-16

## 2023-06-16 RX ORDER — MIDAZOLAM HYDROCHLORIDE 2 MG/2ML
INJECTION, SOLUTION INTRAMUSCULAR; INTRAVENOUS AS NEEDED
Status: DISCONTINUED | OUTPATIENT
Start: 2023-06-16 | End: 2023-06-16

## 2023-06-16 RX ORDER — SODIUM CHLORIDE 9 MG/ML
125 INJECTION, SOLUTION INTRAVENOUS CONTINUOUS
Status: DISCONTINUED | OUTPATIENT
Start: 2023-06-16 | End: 2023-06-16 | Stop reason: HOSPADM

## 2023-06-16 RX ORDER — OXYCODONE HYDROCHLORIDE 5 MG/1
5 TABLET ORAL EVERY 4 HOURS PRN
Qty: 5 TABLET | Refills: 0 | Status: SHIPPED | OUTPATIENT
Start: 2023-06-16 | End: 2023-06-18

## 2023-06-16 RX ORDER — PROPOFOL 10 MG/ML
INJECTION, EMULSION INTRAVENOUS AS NEEDED
Status: DISCONTINUED | OUTPATIENT
Start: 2023-06-16 | End: 2023-06-16

## 2023-06-16 RX ORDER — ROCURONIUM BROMIDE 10 MG/ML
INJECTION, SOLUTION INTRAVENOUS AS NEEDED
Status: DISCONTINUED | OUTPATIENT
Start: 2023-06-16 | End: 2023-06-16

## 2023-06-16 RX ORDER — BUPIVACAINE HYDROCHLORIDE 2.5 MG/ML
INJECTION, SOLUTION EPIDURAL; INFILTRATION; INTRACAUDAL AS NEEDED
Status: DISCONTINUED | OUTPATIENT
Start: 2023-06-16 | End: 2023-06-16 | Stop reason: HOSPADM

## 2023-06-16 RX ADMIN — KETOROLAC TROMETHAMINE 30 MG: 30 INJECTION, SOLUTION INTRAMUSCULAR at 08:20

## 2023-06-16 RX ADMIN — PROPOFOL 200 MG: 10 INJECTION, EMULSION INTRAVENOUS at 07:31

## 2023-06-16 RX ADMIN — ROCURONIUM BROMIDE 50 MG: 10 INJECTION, SOLUTION INTRAVENOUS at 07:31

## 2023-06-16 RX ADMIN — SODIUM CHLORIDE 125 ML/HR: 0.9 INJECTION, SOLUTION INTRAVENOUS at 05:50

## 2023-06-16 RX ADMIN — ROCURONIUM BROMIDE 10 MG: 10 INJECTION, SOLUTION INTRAVENOUS at 08:10

## 2023-06-16 RX ADMIN — SUGAMMADEX 200 MG: 100 INJECTION, SOLUTION INTRAVENOUS at 08:23

## 2023-06-16 RX ADMIN — DEXAMETHASONE SODIUM PHOSPHATE 10 MG: 10 INJECTION INTRAMUSCULAR; INTRAVENOUS at 07:35

## 2023-06-16 RX ADMIN — FENTANYL CITRATE 50 MCG: 50 INJECTION INTRAMUSCULAR; INTRAVENOUS at 07:31

## 2023-06-16 RX ADMIN — MIDAZOLAM 2 MG: 1 INJECTION INTRAMUSCULAR; INTRAVENOUS at 07:24

## 2023-06-16 RX ADMIN — LIDOCAINE HYDROCHLORIDE 100 MG: 20 INJECTION, SOLUTION EPIDURAL; INFILTRATION; INTRACAUDAL at 07:31

## 2023-06-16 RX ADMIN — IBUPROFEN 600 MG: 600 TABLET ORAL at 09:45

## 2023-06-16 RX ADMIN — SODIUM CHLORIDE, SODIUM LACTATE, POTASSIUM CHLORIDE, AND CALCIUM CHLORIDE: .6; .31; .03; .02 INJECTION, SOLUTION INTRAVENOUS at 07:40

## 2023-06-16 RX ADMIN — FENTANYL CITRATE 25 MCG: 50 INJECTION INTRAMUSCULAR; INTRAVENOUS at 08:07

## 2023-06-16 RX ADMIN — FENTANYL CITRATE 25 MCG: 50 INJECTION INTRAMUSCULAR; INTRAVENOUS at 08:10

## 2023-06-16 RX ADMIN — ONDANSETRON 4 MG: 2 INJECTION INTRAMUSCULAR; INTRAVENOUS at 07:35

## 2023-06-16 NOTE — INTERVAL H&P NOTE
H&P reviewed  After examining the patient I find no changes in the patients condition since the H&P had been written      Vitals:    06/16/23 0532   BP: 113/59   Pulse: 58   Resp: 16   Temp: 98 °F (36 7 °C)   SpO2: 100%

## 2023-06-16 NOTE — ANESTHESIA POSTPROCEDURE EVALUATION
"Post-Op Assessment Note    CV Status:  Stable    Pain management: adequate     Mental Status:  Alert and awake   Hydration Status:  Euvolemic   PONV Controlled:  Controlled   Airway Patency:  Patent      Post Op Vitals Reviewed: Yes      Staff: Anesthesiologist         No notable events documented      BP      Temp      Pulse     Resp      SpO2      /60   Pulse 58   Temp 97 7 °F (36 5 °C) (Temporal)   Resp 16   Ht 5' 5\" (1 651 m)   Wt 79 8 kg (175 lb 14 8 oz)   SpO2 100%   BMI 29 28 kg/m²     "

## 2023-06-16 NOTE — OP NOTE
OPERATIVE REPORT  PATIENT NAME: Eliecer Mars    :  1985  MRN: [id number]  Pt Location: [facility] OR ROOM 04    SURGERY DATE: 2023    Surgeon(s) and Role:     Radha Her DO - Primary     * Gigi Morrow MD - Assisting    Preop Diagnosis:  Sterilization [Z30 2]    Post-Op Diagnosis Codes:     * Sterilization [Z30 2]    Procedure(s):  Bilateral - SALPINGECTOMY  LAPAROSCOPIC    Specimen(s):  ID Type Source Tests Collected by Time Destination   1 : B/L Fallopian Tubes Tissue Fallopian Tubes, Bilateral TISSUE EXAM Radha Her DO 2023 0809        Estimated Blood Loss:   25 mL    Drains:  [REMOVED] Urethral Catheter Latex 16 Fr  (Removed)   Number of days: 0       Anesthesia Type:   General    Operative Indications:  Sterilization [Z30 2]    Operative Findings:  Normal external female genitalia- no evidence of lesions or lacerations   Bimanual exam revealed a anteverted, fibroid uterus, freely mobile  No adnexal masses appreciated   Speculum exam revealed a normal vagina and cervix; patient is currently on her period  No evidence of bleeding, lesions or lacerations  Laparoscopic exam revealed normal bowel, bladder, vasculature and liver edge  There was no evidence of bowel injury under the umbilical port with trocar entry  On inspection of the pelvis the uterus, bilateral fallopian tubes and ovaries were grossly normal in appearance  Small, likely ovulatory cyst seen on L ovary  Excellent hemostasis at surgical sites     Complications:   None    Procedure and Technique:  Patient was taken to the operating room where a time out was performed to confirm correct patient and correct procedure  General endotracheal anesthesia (GET) was administered and the patient was positioned on the OR table in the dorsal lithotomy position  All pressure points were padded and a Kia hugger was placed to maintain control of core body temperature   A bimanual exam was performed and the uterus was noted to be anteverted; fibroids were felt  No palpable adnexal masses or fullness  The patient was prepped and draped in the usual sterile fashion with chloroprep on the abdomen and and the perineum  Attention was turned to the perineum  Speculum was inserted and the cervix was identified  The 12 o'clock position was grasped with a single-toothed tenaculum and a Dawson manipulator was placed in the cervical os  This was then affixed to the tenaculum to be used for uterine manipulation  Speculum was removed and attention was turned to the abdomen for laparoscopy  Bupivacaine 0 25% was infiltrated into the umbilicus and 2 penetrating towel clamps were placed  A 5 mm vertical incision was made in the base of the umbilicus  and a 5 mm trocar was introduced under direct visualization  Intra-abdominal entry was confirmed and abdomen was insufflated to 15mmHg  Penetrating towel clamps were removed  Subsequently, the entire abdomen and pelvis was inspected and there was no evidence of injury to bowel, bladder, vasculature, or other structures  Additional 5mm trochars was introduced in the right and left lower abdomen approximately 2cm superior and medial to the iliac crests, under direct visualization, after the injection of 0 25% bupivacaine  Patient was placed in Trendelenburg and the uterus was elevated to visualize the fallopian tubes and ovaries, as well as the cul de sac  Findings were as noted above  The right fallopian tube was grasped at its fimbriated end with a [place] grasper and elevated to visualize the mesosalpinx  The Enseal device was used to ligate along the mesosalpinx, working proximally and taking care to avoid ovarian vasculature  The fallopian tube was transected appx 2cm distal to the cornu  The specimen was removed, without difficulty, through the laparoscopic port  The pedicle was subsequently inspected and noted to be hemostatic   This was repeated on the left side  The abdomen and pelvis were again inspected, with excellent hemostasis noted  All instruments were removed from the abdomen and the pneumoperitoneum was released  All instruments were removed from the vagina  All counts were correct times two  The skin incisions were closed with 4-0 monocryl in a subcuticular fashion  The patient tolerated the procedure well and was taken to the recovery room in stable condition  I was present for the entire procedure      Patient Disposition:  PACU         SIGNATURE: Kendrick Babinski, MD  DATE: June 16, 2023  TIME: 8:39 AM

## 2023-06-19 ENCOUNTER — TELEPHONE (OUTPATIENT)
Dept: OBGYN CLINIC | Facility: CLINIC | Age: 38
End: 2023-06-19

## 2023-06-20 PROCEDURE — 88302 TISSUE EXAM BY PATHOLOGIST: CPT | Performed by: PATHOLOGY

## 2023-06-27 ENCOUNTER — CLINICAL SUPPORT (OUTPATIENT)
Dept: OBGYN CLINIC | Facility: CLINIC | Age: 38
End: 2023-06-27

## 2023-06-27 VITALS
WEIGHT: 176.8 LBS | SYSTOLIC BLOOD PRESSURE: 104 MMHG | BODY MASS INDEX: 29.42 KG/M2 | DIASTOLIC BLOOD PRESSURE: 67 MMHG | HEART RATE: 60 BPM

## 2023-06-27 DIAGNOSIS — Z23 NEED FOR HPV VACCINE: Primary | ICD-10-CM

## 2023-06-27 PROCEDURE — 90471 IMMUNIZATION ADMIN: CPT

## 2023-06-27 PROCEDURE — 90651 9VHPV VACCINE 2/3 DOSE IM: CPT

## 2023-06-29 ENCOUNTER — OFFICE VISIT (OUTPATIENT)
Dept: OBGYN CLINIC | Facility: CLINIC | Age: 38
End: 2023-06-29

## 2023-06-29 VITALS
HEIGHT: 65 IN | DIASTOLIC BLOOD PRESSURE: 75 MMHG | WEIGHT: 178.4 LBS | SYSTOLIC BLOOD PRESSURE: 116 MMHG | BODY MASS INDEX: 29.72 KG/M2 | HEART RATE: 69 BPM

## 2023-06-29 DIAGNOSIS — Z09 POSTOP CHECK: Primary | ICD-10-CM

## 2023-06-29 PROCEDURE — 99213 OFFICE O/P EST LOW 20 MIN: CPT | Performed by: OBSTETRICS & GYNECOLOGY

## 2023-06-30 NOTE — PATIENT INSTRUCTIONS
Curtis por nelson confianza en nuestro equipo  Orlie [place] y agradecemos renate comentarios  Si recibe pablo encuesta nuestra, tómese unos momentos para informarnos cómo estamos     Sinceramente,  [facility], DO

## 2023-06-30 NOTE — PROGRESS NOTES
"Subjective     Pheobe Na is a 41 y o  female who presents to the clinic 2 weeks status post Laparoscopic bilateral salpingectomy for Sterilization  Eating a regular diet without difficulty  Bowel movements are normal  The patient is not having any pain  The following portions of the patient's history were reviewed and updated as appropriate: allergies, current medications, past family history, past medical history, past social history, past surgical history and problem list     Review of Systems  Pertinent items are noted in HPI  Objective     /75   Pulse 69   Ht 5' 5\" (1 651 m)   Wt 80 9 kg (178 lb 6 4 oz)   LMP 06/26/2023 (Approximate)   BMI 29 69 kg/m²   General:  alert and oriented, in no acute distress   Abdomen: soft, bowel sounds active, non-tender   Incision:   healing well, no drainage, no erythema, no hernia, no seroma, no swelling, no dehiscence, incision well approximated         Assessment      Doing well postoperatively  Operative findings again reviewed  Pathology report discussed  Plan     1  Continue any current medications  2  Wound care discussed    3  Activity restrictions: none    "

## 2023-10-23 ENCOUNTER — TELEPHONE (OUTPATIENT)
Dept: FAMILY MEDICINE CLINIC | Facility: CLINIC | Age: 38
End: 2023-10-23

## 2023-11-06 ENCOUNTER — OFFICE VISIT (OUTPATIENT)
Dept: FAMILY MEDICINE CLINIC | Facility: CLINIC | Age: 38
End: 2023-11-06

## 2023-11-06 VITALS
RESPIRATION RATE: 18 BRPM | HEART RATE: 83 BPM | SYSTOLIC BLOOD PRESSURE: 118 MMHG | HEIGHT: 65 IN | WEIGHT: 179 LBS | DIASTOLIC BLOOD PRESSURE: 74 MMHG | TEMPERATURE: 98.5 F | BODY MASS INDEX: 29.82 KG/M2 | OXYGEN SATURATION: 98 %

## 2023-11-06 DIAGNOSIS — Z98.890 STATUS POST CARDIAC CATHETERIZATION: ICD-10-CM

## 2023-11-06 DIAGNOSIS — Z13.220 SCREENING CHOLESTEROL LEVEL: ICD-10-CM

## 2023-11-06 DIAGNOSIS — E53.8 B12 DEFICIENCY: ICD-10-CM

## 2023-11-06 DIAGNOSIS — G44.229 CHRONIC TENSION-TYPE HEADACHE, NOT INTRACTABLE: Primary | ICD-10-CM

## 2023-11-06 DIAGNOSIS — E55.9 VITAMIN D DEFICIENCY: ICD-10-CM

## 2023-11-06 PROCEDURE — 99214 OFFICE O/P EST MOD 30 MIN: CPT

## 2023-11-06 NOTE — PROGRESS NOTES
Name: Pamela Perry      : 1985      MRN: [id number]  Encounter Provider: OMAR Fernandez  Encounter Date: 2023   Encounter department: [address]     1. Chronic tension-type headache, not intractable  Assessment & Plan:  - Reviewed lifestyle management such as exercise, meditation, drinking 64 oz of water daily, and following a healthy diet. F/u with ophthalmalogy  - Tylenol prn      2. Status post cardiac catheterization  -     Ambulatory Referral to Cardiology; Future  -     ECG 12 lead; Future    3. Screening cholesterol level  -     Lipid Panel with Direct LDL reflex; Future    4. Vitamin D deficiency  -     Vitamin D 25 hydroxy; Future    5. B12 deficiency  -     Vitamin B12; Future           Subjective      Pamela Perry is a 41 y.o. female  has a past medical history of COVID-19 and Iron deficiency anemia. has a past surgical history that includes Cardiac catheterization; BREAST IMPLANT; and pr laparoscopy w/rmvl adnexal structures (Bilateral, 2023). She reports headaches present for several months. No nausea/vomiting, no phonophobia, (+) photophobia. She reports that she is overdue for an eye exam and sees blurry when reading. She sleeps about 5-6 hours a day because she is so busy. Drinks at least 64 oz water per day. She reports high levels of stress. She does not manage her stress. Headaches occur 3x a week. She uses advil which helps. She is also requesting a cardiology referral. She has a hx of a cardiac catheterization in [place] country. . Denies hx of a myocardial infarction. Accurate hx is very difficult to obtain. Unsure of indication for catheterization. Reports cardiology never prescribed her any medication. Does not have medical records. Review of Systems   Constitutional:  Negative for chills and fever. HENT:  Negative for ear pain and sore throat.     Eyes:  Negative for pain and visual disturbance. Respiratory:  Negative for cough and shortness of breath. Cardiovascular:  Negative for chest pain and palpitations. Gastrointestinal:  Negative for abdominal pain and vomiting. Genitourinary:  Negative for dysuria and hematuria. Musculoskeletal:  Negative for arthralgias and back pain. Skin:  Negative for color change and rash. Neurological:  Positive for headaches. Negative for seizures and syncope. All other systems reviewed and are negative. Current Outpatient Medications on File Prior to Visit   Medication Sig    [DISCONTINUED] acetaminophen (TYLENOL) 650 mg CR tablet Take 1 tablet (650 mg total) by mouth every 8 (eight) hours as needed for mild pain (Patient not taking: Reported on 6/29/2023)    [DISCONTINUED] ergocalciferol (VITAMIN D2) 50,000 units Take 1 capsule (50,000 Units total) by mouth once a week    [DISCONTINUED] ibuprofen (MOTRIN) 600 mg tablet Take 1 tablet (600 mg total) by mouth every 6 (six) hours as needed for moderate pain (Patient not taking: Reported on 6/29/2023)    [DISCONTINUED] norethindrone-ethinyl estradiol (Junel 1/20) 1-20 MG-MCG per tablet Take 1 tablet by mouth daily (Patient not taking: Reported on 6/29/2023)       Objective     /74 (BP Location: Left arm, Patient Position: Sitting, Cuff Size: Standard)   Pulse 83   Temp 98.5 °F (36.9 °C) (Temporal)   Resp 18   Ht 5' 5" (1.651 m)   Wt 81.2 kg (179 lb)   LMP  (LMP Unknown)   SpO2 98%   Breastfeeding No   BMI 29.79 kg/m²     Physical Exam  Vitals and nursing note reviewed. Constitutional:       Appearance: She is overweight. HENT:      Head: Normocephalic and atraumatic. Right Ear: External ear normal.      Left Ear: External ear normal.      Nose: Nose normal.   Eyes:      Extraocular Movements: Extraocular movements intact. Conjunctiva/sclera: Conjunctivae normal.      Pupils: Pupils are equal, round, and reactive to light.    Cardiovascular:      Rate and Rhythm: Normal rate and regular rhythm. Pulses: Normal pulses. Heart sounds: Normal heart sounds. Pulmonary:      Effort: Pulmonary effort is normal.      Breath sounds: Normal breath sounds. Abdominal:      General: Bowel sounds are normal.      Palpations: Abdomen is soft. Tenderness: There is no abdominal tenderness. Musculoskeletal:         General: Normal range of motion. Cervical back: Normal range of motion. Skin:     General: Skin is warm and dry. Neurological:      General: No focal deficit present. Mental Status: She is alert and oriented to person, place, and time. Mental status is at baseline. Cranial Nerves: Cranial nerves 2-12 are intact. Sensory: Sensation is intact. Motor: Motor function is intact. Coordination: Coordination is intact. Gait: Gait is intact. Psychiatric:         Mood and Affect: Mood normal.         Behavior: Behavior normal.         Thought Content:  Thought content normal.         Judgment: Judgment normal.       Cici Santacruz

## 2023-11-06 NOTE — ASSESSMENT & PLAN NOTE
- Reviewed lifestyle management such as exercise, meditation, drinking 64 oz of water daily, and following a healthy diet.  F/u with ophthalmalogy  - Tylenol prn

## 2023-11-13 ENCOUNTER — APPOINTMENT (OUTPATIENT)
Dept: LAB | Facility: HOSPITAL | Age: 38
End: 2023-11-13
Payer: COMMERCIAL

## 2023-11-13 DIAGNOSIS — Z13.220 SCREENING CHOLESTEROL LEVEL: ICD-10-CM

## 2023-11-13 DIAGNOSIS — E53.8 B12 DEFICIENCY: ICD-10-CM

## 2023-11-13 DIAGNOSIS — E55.9 VITAMIN D DEFICIENCY: ICD-10-CM

## 2023-11-13 LAB
25(OH)D3 SERPL-MCNC: 18.9 NG/ML (ref 30–100)
CHOLEST SERPL-MCNC: 183 MG/DL
HDLC SERPL-MCNC: 47 MG/DL
LDLC SERPL CALC-MCNC: 113 MG/DL (ref 0–100)
TRIGL SERPL-MCNC: 115 MG/DL
VIT B12 SERPL-MCNC: 395 PG/ML (ref 180–914)

## 2023-11-13 PROCEDURE — 80061 LIPID PANEL: CPT

## 2023-11-13 PROCEDURE — 82607 VITAMIN B-12: CPT

## 2023-11-13 PROCEDURE — 82306 VITAMIN D 25 HYDROXY: CPT

## 2023-11-13 PROCEDURE — 36415 COLL VENOUS BLD VENIPUNCTURE: CPT

## 2023-12-04 ENCOUNTER — HOSPITAL ENCOUNTER (OUTPATIENT)
Dept: RADIOLOGY | Facility: HOSPITAL | Age: 38
End: 2023-12-04
Payer: COMMERCIAL

## 2023-12-04 ENCOUNTER — OFFICE VISIT (OUTPATIENT)
Dept: LAB | Facility: HOSPITAL | Age: 38
End: 2023-12-04
Payer: COMMERCIAL

## 2023-12-04 DIAGNOSIS — Z98.890 STATUS POST CARDIAC CATHETERIZATION: ICD-10-CM

## 2023-12-04 LAB
ATRIAL RATE: 61 BPM
P AXIS: 68 DEGREES
PR INTERVAL: 148 MS
QRS AXIS: 0 DEGREES
QRSD INTERVAL: 86 MS
QT INTERVAL: 426 MS
QTC INTERVAL: 428 MS
T WAVE AXIS: 20 DEGREES
VENTRICULAR RATE: 61 BPM

## 2023-12-04 PROCEDURE — 93005 ELECTROCARDIOGRAM TRACING: CPT

## 2023-12-27 ENCOUNTER — ANNUAL EXAM (OUTPATIENT)
Dept: OBGYN CLINIC | Facility: CLINIC | Age: 38
End: 2023-12-27

## 2023-12-27 VITALS
HEART RATE: 79 BPM | SYSTOLIC BLOOD PRESSURE: 123 MMHG | WEIGHT: 179 LBS | BODY MASS INDEX: 29.82 KG/M2 | HEIGHT: 65 IN | DIASTOLIC BLOOD PRESSURE: 81 MMHG

## 2023-12-27 DIAGNOSIS — Z01.419 ROUTINE GYNECOLOGICAL EXAMINATION: Primary | ICD-10-CM

## 2023-12-27 DIAGNOSIS — N64.4 BREAST PAIN, LEFT: ICD-10-CM

## 2023-12-27 DIAGNOSIS — Z12.4 SCREENING FOR CERVICAL CANCER: ICD-10-CM

## 2023-12-27 PROCEDURE — 99395 PREV VISIT EST AGE 18-39: CPT | Performed by: OBSTETRICS & GYNECOLOGY

## 2023-12-27 PROCEDURE — G0145 SCR C/V CYTO,THINLAYER,RESCR: HCPCS | Performed by: OBSTETRICS & GYNECOLOGY

## 2023-12-27 PROCEDURE — G0476 HPV COMBO ASSAY CA SCREEN: HCPCS | Performed by: OBSTETRICS & GYNECOLOGY

## 2023-12-27 NOTE — PROGRESS NOTES
"ANNUAL GYNECOLOGICAL EXAMINATION    Mirlande Eldridge is a 38 y.o. female who presents today for annual GYN exam with c/o left breast pain.  Her last pap smear was performed 22 and result was negative with HPV 16pos.  She reports history of abnormal pap smears in her past.  She had HIV screening performed 22 and it was negative.  She reports menses as regular.  Patient's last menstrual period was 2023.  Her general medical history has been reviewed and she reports it as follows:    Past Medical History:   Diagnosis Date    COVID-19 2020    Iron deficiency anemia      Past Surgical History:   Procedure Laterality Date    BREAST IMPLANT      CARDIAC CATHETERIZATION      MS LAPAROSCOPY W/RMVL ADNEXAL STRUCTURES Bilateral 2023    Procedure: SALPINGECTOMY, LAPAROSCOPIC;  Surgeon: Yardlie Toussaint-Foster, DO;  Location: [facility] OR;  Service: Gynecology     OB History          3    Para   2    Term   2       0    AB   1    Living   2         SAB   1    IAB   0    Ectopic   0    Multiple   0    Live Births   2               Social History     Tobacco Use    Smoking status: Never     Passive exposure: Never    Smokeless tobacco: Never   Vaping Use    Vaping status: Never Used   Substance Use Topics    Alcohol use: Not Currently    Drug use: Never     Social History     Substance and Sexual Activity   Sexual Activity Yes    Partners: Male    Birth control/protection: OCP     Cancer-related family history includes Breast cancer in her paternal grandmother. There is no history of Colon cancer, Cancer, or Ovarian cancer.    No current outpatient medications    Review of Systems:  Review of Systems   Constitutional:         Left breast pain   All other systems reviewed and are negative.      Physical Exam:  /81   Pulse 79   Ht 5' 5\" (1.651 m)   Wt 81.2 kg (179 lb)   LMP 2023   BMI 29.79 kg/m²   Physical Exam  Constitutional:       Appearance: Normal appearance. "   Genitourinary:      Bladder and urethral meatus normal.      No lesions in the vagina.      Genitourinary Comments: Area of tenderness and palpable nodes vs masses(medial)      Right Labia: No rash, tenderness or lesions.     Left Labia: No tenderness, lesions or rash.     No inguinal adenopathy present in the right or left side.     No vaginal discharge or bleeding.        Right Adnexa: not tender, not full and no mass present.     Left Adnexa: not tender, not full and no mass present.     No cervical motion tenderness, discharge or lesion.      Uterus is not enlarged or tender.      No uterine mass detected.     No urethral tenderness or mass present.   Breasts:     Breasts are soft.     Right: No swelling, bleeding, inverted nipple, mass, nipple discharge, skin change or tenderness.      Left: Mass and tenderness present. No swelling, bleeding, inverted nipple, nipple discharge or skin change.   HENT:      Head: Normocephalic and atraumatic.   Cardiovascular:      Rate and Rhythm: Normal rate and regular rhythm.   Pulmonary:      Effort: Pulmonary effort is normal.      Breath sounds: Normal breath sounds.   Chest:       Abdominal:      General: Bowel sounds are normal.      Palpations: Abdomen is soft.      Hernia: There is no hernia in the left inguinal area or right inguinal area.   Musculoskeletal:         General: Normal range of motion.      Cervical back: Normal range of motion and neck supple.   Lymphadenopathy:      Upper Body:      Right upper body: No supraclavicular or axillary adenopathy.      Left upper body: Axillary adenopathy present. No supraclavicular adenopathy.      Lower Body: No right inguinal adenopathy. No left inguinal adenopathy.   Neurological:      Mental Status: She is alert and oriented to person, place, and time.   Skin:     General: Skin is warm and dry.   Psychiatric:         Mood and Affect: Mood normal.   Vitals reviewed.           Assessment/Plan:   1. Normal well-woman GYN  exam.  2. Cervical cancer screening:  Normal cervical exam.  Pap smear done with HPV reflex.  Has received HPV vaccine in the past.     3. STD screening:  Patient declines.    4. Breast cancer screening:  Abnormal breast exam.  Order placed for bilateral diagnostic mammogram and left breast diagnostic ultrasound.  Reviewed breast self-awareness.   5. Depression Screening: Patient's depression screening was assessed with a PHQ-2 score of 3. Their PHQ-9 score was 8. Clinically patient does not have depression. No treatment is required.     6. BMI Counseling: Body mass index is 29.79 kg/m². Discussed the patient's BMI with her. The BMI is above normal. Nutrition recommendations include decreasing overall calorie intake.   7. Contraception:  Tubal ligation   8. Return to office 3wks.    Reviewed with patient that test results are available in [facility] immediately, but that they will not necessarily be reviewed by me immediately.  Explained that I will review results at my earliest opportunity and contact patient appropriately.

## 2023-12-27 NOTE — PATIENT INSTRUCTIONS
Curtis por nelson confianza en nuestro equipo.   Le agradecemos y agradecemos renate comentarios.   Si recibe pablo encuesta nuestra, tómese unos momentos para informarnos cómo estamos.   Sinceramente,  Yacorinee Toussaint-Foster, DO

## 2023-12-28 LAB
HPV HR 12 DNA CVX QL NAA+PROBE: NEGATIVE
HPV16 DNA CVX QL NAA+PROBE: POSITIVE
HPV18 DNA CVX QL NAA+PROBE: NEGATIVE

## 2024-01-01 PROBLEM — R94.31 ABNORMAL EKG: Status: ACTIVE | Noted: 2024-01-01

## 2024-01-01 PROBLEM — E78.00 PURE HYPERCHOLESTEROLEMIA: Status: ACTIVE | Noted: 2024-01-01

## 2024-01-04 ENCOUNTER — TELEPHONE (OUTPATIENT)
Dept: FAMILY MEDICINE CLINIC | Facility: CLINIC | Age: 39
End: 2024-01-04

## 2024-01-04 LAB
LAB AP GYN PRIMARY INTERPRETATION: NORMAL
Lab: NORMAL

## 2024-01-04 NOTE — TELEPHONE ENCOUNTER
Kevin good morning, can this pt be reschedule for an appt . She would like an appointment on a Monday after 8:30 am morning.     Thank you!

## 2024-01-08 ENCOUNTER — OFFICE VISIT (OUTPATIENT)
Dept: FAMILY MEDICINE CLINIC | Facility: CLINIC | Age: 39
End: 2024-01-08

## 2024-01-08 VITALS
OXYGEN SATURATION: 97 % | WEIGHT: 181.6 LBS | TEMPERATURE: 98.6 F | BODY MASS INDEX: 30.22 KG/M2 | HEART RATE: 75 BPM | SYSTOLIC BLOOD PRESSURE: 100 MMHG | DIASTOLIC BLOOD PRESSURE: 60 MMHG

## 2024-01-08 DIAGNOSIS — H57.12 PAIN OF LEFT EYE: Primary | ICD-10-CM

## 2024-01-08 DIAGNOSIS — E55.9 VITAMIN D DEFICIENCY: ICD-10-CM

## 2024-01-08 DIAGNOSIS — Z00.00 ANNUAL PHYSICAL EXAM: ICD-10-CM

## 2024-01-08 PROCEDURE — 99395 PREV VISIT EST AGE 18-39: CPT | Performed by: FAMILY MEDICINE

## 2024-01-08 RX ORDER — NAPROXEN 500 MG/1
500 TABLET ORAL 2 TIMES DAILY WITH MEALS
Qty: 60 TABLET | Refills: 0 | Status: SHIPPED | OUTPATIENT
Start: 2024-01-08

## 2024-01-08 RX ORDER — ERGOCALCIFEROL 1.25 MG/1
50000 CAPSULE ORAL WEEKLY
Qty: 12 CAPSULE | Refills: 1 | Status: SHIPPED | OUTPATIENT
Start: 2024-01-08

## 2024-01-08 NOTE — PROGRESS NOTES
ADULT ANNUAL PHYSICAL  [facility] PRACTICE [facility]DIR[facility]    NAME: iMrlande Eldridge  AGE: 38 y.o. SEX: female  : 1985     DATE: 2024     Assessment and Plan:     Problem List Items Addressed This Visit    None  Visit Diagnoses     Pain of left eye    -  Primary    Possibly related to HA. ED precautions reviewed with patient    Relevant Medications    naproxen (Naprosyn) 500 mg tablet    Other Relevant Orders    Ambulatory Referral to Optometry    Vitamin D deficiency        Relevant Medications    ergocalciferol (VITAMIN D2) 50,000 units    Annual physical exam              Immunizations and preventive care screenings were discussed with patient today. Appropriate education was printed on patient's after visit summary.    Counseling:  Exercise: the importance of regular exercise/physical activity was discussed. Recommend exercise 3-5 times per week for at least 30 minutes.          Return in 3 months (on 2024).     Chief Complaint:     Chief Complaint   Patient presents with   • Physical Exam      History of Present Illness:     Adult Annual Physical   Patient here for a comprehensive physical exam. The patient reports problems - L eye pain .    Diet and Physical Activity  Diet/Nutrition: well balanced diet.   Exercise: no formal exercise.      Depression Screening  PHQ-2/9 Depression Screening         General Health  Sleep: sleeps well.   Hearing: normal - bilateral.  Vision: no vision problems.   Dental: regular dental visits and brushes teeth twice daily.       /GYN Health  Follows with gynecology? yes   Last menstrual period: 2023  Contraceptive method:  tubal ligation .  History of STDs?: no.     Advanced Care Planning  Do you have an advanced directive? no  Do you have a durable medical power of ? no     Review of Systems:     Review of Systems   Eyes:  Positive for photophobia and pain.   Skin:         Scalp sensitivity   All other  systems reviewed and are negative.     Past Medical History:     Past Medical History:   Diagnosis Date   • COVID-19 12/2020   • Iron deficiency anemia       Past Surgical History:     Past Surgical History:   Procedure Laterality Date   • BREAST IMPLANT     • CARDIAC CATHETERIZATION     • [place] LAPAROSCOPY W/RMVL ADNEXAL STRUCTURES Bilateral 6/16/2023    Procedure: SALPINGECTOMY, LAPAROSCOPIC;  Surgeon: Yardlie Toussaint-Foster, DO;  Location: [facility];  Service: Gynecology      Social History:     Social History     Socioeconomic History   • Marital status: Single     Spouse name: None   • Number of children: None   • Years of education: None   • Highest education level: None   Occupational History   • None   Tobacco Use   • Smoking status: Never     Passive exposure: Never   • Smokeless tobacco: Never   Vaping Use   • Vaping status: Never Used   Substance and Sexual Activity   • Alcohol use: Not Currently   • Drug use: Never   • Sexual activity: Yes     Partners: Male     Birth control/protection: OCP   Other Topics Concern   • None   Social History Narrative   • None     Social Determinants of Health     Financial Resource Strain: Low Risk  (3/9/2023)    Overall Financial Resource Strain (CARDIA)    • Difficulty of Paying Living Expenses: Not hard at all   Food Insecurity: No Food Insecurity (3/9/2023)    Hunger Vital Sign    • Worried About Running Out of Food in the Last Year: Never true    • Ran Out of Food in the Last Year: Never true   Transportation Needs: No Transportation Needs (3/9/2023)    PRAPARE - Transportation    • Lack of Transportation (Medical): No    • Lack of Transportation (Non-Medical): No   Physical Activity: Not on file   Stress: Not on file   Social Connections: Not on file   Intimate Partner Violence: Not on file   Housing Stability: Not on file      Family History:     Family History   Problem Relation Age of Onset   • Hypertension Mother    • Hypertension Father    • Diabetes Father     • Breast cancer Paternal Grandmother    • Colon cancer Neg Hx    • Cancer Neg Hx    • Ovarian cancer Neg Hx       Current Medications:     Current Outpatient Medications   Medication Sig Dispense Refill   • ergocalciferol (VITAMIN D2) 50,000 units Take 1 capsule (50,000 Units total) by mouth once a week 12 capsule 1   • naproxen (Naprosyn) 500 mg tablet Take 1 tablet (500 mg total) by mouth 2 (two) times a day with meals 60 tablet 0     No current facility-administered medications for this visit.      Allergies:     No Known Allergies   Physical Exam:     /60 (BP Location: Right arm, Patient Position: Sitting, Cuff Size: Standard)   Pulse 75   Temp 98.6 °F (37 °C) (Temporal)   Wt 82.4 kg (181 lb 9.6 oz)   LMP 12/20/2023   SpO2 97%   BMI 30.22 kg/m²     Physical Exam  Vitals and nursing note reviewed.   Constitutional:       General: She is not in acute distress.     Appearance: She is well-developed.   HENT:      Head: Normocephalic and atraumatic.   Eyes:      Conjunctiva/sclera: Conjunctivae normal.   Cardiovascular:      Rate and Rhythm: Normal rate and regular rhythm.      Heart sounds: No murmur heard.  Pulmonary:      Effort: Pulmonary effort is normal. No respiratory distress.      Breath sounds: Normal breath sounds.   Abdominal:      Palpations: Abdomen is soft.      Tenderness: There is no abdominal tenderness.   Musculoskeletal:         General: No swelling.      Cervical back: Neck supple.   Skin:     General: Skin is warm and dry.      Capillary Refill: Capillary refill takes less than 2 seconds.   Neurological:      Mental Status: She is alert.   Psychiatric:         Mood and Affect: Mood normal.          Kerri Blevins MD   [facility] PRACTICE [facility]DIR[facility]

## 2024-01-09 ENCOUNTER — TELEPHONE (OUTPATIENT)
Dept: OBGYN CLINIC | Facility: CLINIC | Age: 39
End: 2024-01-09

## 2024-01-10 ENCOUNTER — TELEPHONE (OUTPATIENT)
Dept: OBGYN CLINIC | Facility: CLINIC | Age: 39
End: 2024-01-10

## 2024-01-10 NOTE — TELEPHONE ENCOUNTER
----- Message from Yardlie Toussaint-Foster, DO sent at 1/8/2024  2:03 PM EST -----  Please call patient and inform her that she needs a colposcopy forn nml pap with HPV 16. Thx  ----- Message -----  From: Lab, Background User  Sent: 12/28/2023   5:34 PM EST  To: Yardlie Toussaint-Foster, DO

## 2024-01-15 ENCOUNTER — TELEPHONE (OUTPATIENT)
Dept: MAMMOGRAPHY | Facility: CLINIC | Age: 39
End: 2024-01-15

## 2024-02-19 ENCOUNTER — TELEPHONE (OUTPATIENT)
Dept: OBGYN CLINIC | Facility: CLINIC | Age: 39
End: 2024-02-19

## 2024-03-05 ENCOUNTER — HOSPITAL ENCOUNTER (OUTPATIENT)
Dept: MAMMOGRAPHY | Facility: CLINIC | Age: 39
Discharge: HOME | End: 2024-03-05
Payer: COMMERCIAL

## 2024-03-05 VITALS — WEIGHT: 181 LBS | HEIGHT: 65 IN | BODY MASS INDEX: 30.16 KG/M2

## 2024-03-05 DIAGNOSIS — N64.4 BREAST PAIN, LEFT: ICD-10-CM

## 2024-03-05 PROCEDURE — 77066 DX MAMMO INCL CAD BI: CPT

## 2024-03-05 PROCEDURE — G0279 TOMOSYNTHESIS, MAMMO: HCPCS

## 2024-03-05 PROCEDURE — 76642 ULTRASOUND BREAST LIMITED: CPT

## 2024-04-15 ENCOUNTER — PATIENT OUTREACH (OUTPATIENT)
Dept: OBGYN CLINIC | Facility: CLINIC | Age: 39
End: 2024-04-15

## 2024-04-15 ENCOUNTER — PROCEDURE VISIT (OUTPATIENT)
Dept: OBGYN CLINIC | Facility: CLINIC | Age: 39
End: 2024-04-15

## 2024-04-15 VITALS
BODY MASS INDEX: 30.32 KG/M2 | HEART RATE: 69 BPM | WEIGHT: 182 LBS | HEIGHT: 65 IN | SYSTOLIC BLOOD PRESSURE: 113 MMHG | DIASTOLIC BLOOD PRESSURE: 72 MMHG

## 2024-04-15 DIAGNOSIS — Z59.41 FOOD INSECURITY: Primary | ICD-10-CM

## 2024-04-15 DIAGNOSIS — R87.810 HUMAN PAPILLOMAVIRUS (HPV) TYPE 16 DNA DETECTED IN CERVICAL SPECIMEN: ICD-10-CM

## 2024-04-15 LAB — SL AMB POCT URINE HCG: NEGATIVE

## 2024-04-15 PROCEDURE — 57454 BX/CURETT OF CERVIX W/SCOPE: CPT | Performed by: OBSTETRICS & GYNECOLOGY

## 2024-04-15 PROCEDURE — 88344 IMHCHEM/IMCYTCHM EA MLT ANTB: CPT | Performed by: PATHOLOGY

## 2024-04-15 PROCEDURE — 81025 URINE PREGNANCY TEST: CPT | Performed by: OBSTETRICS & GYNECOLOGY

## 2024-04-15 PROCEDURE — 88305 TISSUE EXAM BY PATHOLOGIST: CPT | Performed by: PATHOLOGY

## 2024-04-15 SDOH — ECONOMIC STABILITY - FOOD INSECURITY: FOOD INSECURITY: Z59.41

## 2024-04-15 NOTE — PROGRESS NOTES
"   Colposcopy     Date/Time  4/15/2024 11:30 AM     Universal Protocol   Consent: Verbal consent obtained. Written consent obtained.  Risks and benefits: risks, benefits and alternatives were discussed  Consent given by: patient  Time out: Immediately prior to procedure a \"time out\" was called to verify the correct patient, procedure, equipment, support staff and site/side marked as required.  Timeout called at: 4/15/2024 11:53 AM.  Patient understanding: patient states understanding of the procedure being performed  Patient consent: the patient's understanding of the procedure matches consent given  Procedure consent: procedure consent matches procedure scheduled  Relevant documents: relevant documents present and verified  Test results: test results available and properly labeled  Patient identity confirmed: verbally with patient and provided demographic data     Performed by  Yardlie Toussaint-Foster, DO   Authorized by  Yardlie Toussaint-Foster, DO     Pre-procedure details      Pre-procedure timeout performed: yes      Prepped with: acetic acid     Indication    Indications: HPV 16.   Procedure Details   Procedure: Colposcopy w/ cervical biopsy and ECC      Under satisfactory analgesia the patient was prepped and draped in the dorsal lithotomy position: yes      [place] speculum was placed in the vagina: yes      Under colposcopic examination the transition zone was seen in entirety: yes      Endocervix was curetted using a Kevorkian curette: yes      Cervical biopsy performed with a cervical biopsy punch: yes      Monsel's solution was applied: yes      Biopsy(s): yes      Location:  2,11    Specimen to pathology: yes     Post-procedure      Findings: White epithelium      Impression: Low grade cervical dysplasia     Comments       Discuss with patient HPV vaccination.  Patient will decide at next visit.        "

## 2024-04-15 NOTE — PROGRESS NOTES
TROY BARTON spoke with 40 y/o-S-P2-  Amharic speaking woman to address her needs. TROY BARTON introduced self and discussed the reason for the call. Pt resides with her 2 kids. Pt os employed full time. Pt denies transportation issues. Pt main need is getting assistance to obtain food. Pt was denies for SNAP due to income. Pt also was denies for [employer] and [employer] for same reason. Pt states er kids dad does not pay child support either.    TROY BARTON informed Pt about food pantry and information for local ones were send via Find help. Pt denies other needs at this time. TROY BARTON explained her role and provided contact information. Pt was encouraged to call at any time needed.

## 2024-04-19 ENCOUNTER — CLINICAL SUPPORT (OUTPATIENT)
Dept: FAMILY MEDICINE CLINIC | Facility: CLINIC | Age: 39
End: 2024-04-19

## 2024-04-19 DIAGNOSIS — Z11.1 PPD SCREENING TEST: Primary | ICD-10-CM

## 2024-04-19 PROCEDURE — 86580 TB INTRADERMAL TEST: CPT

## 2024-04-19 PROCEDURE — 88344 IMHCHEM/IMCYTCHM EA MLT ANTB: CPT | Performed by: PATHOLOGY

## 2024-04-19 PROCEDURE — 88305 TISSUE EXAM BY PATHOLOGIST: CPT | Performed by: PATHOLOGY

## 2024-04-22 ENCOUNTER — APPOINTMENT (OUTPATIENT)
Dept: LAB | Facility: HOSPITAL | Age: 39
End: 2024-04-22
Payer: COMMERCIAL

## 2024-04-22 ENCOUNTER — CLINICAL SUPPORT (OUTPATIENT)
Dept: FAMILY MEDICINE CLINIC | Facility: CLINIC | Age: 39
End: 2024-04-22

## 2024-04-22 ENCOUNTER — HOSPITAL ENCOUNTER (OUTPATIENT)
Dept: RADIOLOGY | Facility: HOSPITAL | Age: 39
Discharge: HOME | End: 2024-04-22
Payer: COMMERCIAL

## 2024-04-22 DIAGNOSIS — Z11.1 ENCOUNTER FOR PPD SKIN TEST READING: Primary | ICD-10-CM

## 2024-04-22 DIAGNOSIS — R76.11 POSITIVE PPD: ICD-10-CM

## 2024-04-22 DIAGNOSIS — R76.11 POSITIVE PPD: Primary | ICD-10-CM

## 2024-04-22 LAB
INDURATION: 16 MM
TB SKIN TEST: POSITIVE

## 2024-04-22 PROCEDURE — 86480 TB TEST CELL IMMUN MEASURE: CPT

## 2024-04-22 PROCEDURE — 71046 X-RAY EXAM CHEST 2 VIEWS: CPT

## 2024-04-22 PROCEDURE — 36415 COLL VENOUS BLD VENIPUNCTURE: CPT

## 2024-04-23 LAB
GAMMA INTERFERON BACKGROUND BLD IA-ACNC: 0.04 IU/ML
M TB IFN-G BLD-IMP: NEGATIVE
M TB IFN-G CD4+ BCKGRND COR BLD-ACNC: 0.08 IU/ML
M TB IFN-G CD4+ BCKGRND COR BLD-ACNC: 0.09 IU/ML
MITOGEN IGNF BCKGRD COR BLD-ACNC: 9.96 IU/ML

## 2024-04-24 ENCOUNTER — TELEPHONE (OUTPATIENT)
Dept: FAMILY MEDICINE CLINIC | Facility: CLINIC | Age: 39
End: 2024-04-24

## 2024-04-24 NOTE — TELEPHONE ENCOUNTER
Patient came in asking for her tb shruti ferin results and xrays. She needs them for work. Please advise.

## 2024-06-10 ENCOUNTER — OFFICE VISIT (OUTPATIENT)
Dept: OBGYN CLINIC | Facility: CLINIC | Age: 39
End: 2024-06-10

## 2024-06-10 VITALS
HEIGHT: 65 IN | SYSTOLIC BLOOD PRESSURE: 119 MMHG | BODY MASS INDEX: 29.85 KG/M2 | WEIGHT: 179.2 LBS | HEART RATE: 70 BPM | DIASTOLIC BLOOD PRESSURE: 76 MMHG

## 2024-06-10 DIAGNOSIS — Z71.2 ENCOUNTER TO DISCUSS TEST RESULTS: Primary | ICD-10-CM

## 2024-06-10 DIAGNOSIS — N87.0 DYSPLASIA OF CERVIX, LOW GRADE (CIN 1): ICD-10-CM

## 2024-06-10 DIAGNOSIS — R87.810 HUMAN PAPILLOMAVIRUS (HPV) TYPE 16 DNA DETECTED IN CERVICAL SPECIMEN: ICD-10-CM

## 2024-06-10 PROCEDURE — 99213 OFFICE O/P EST LOW 20 MIN: CPT | Performed by: OBSTETRICS & GYNECOLOGY

## 2024-06-10 NOTE — PROGRESS NOTES
"PROBLEM GYNECOLOGICAL VISIT    Mirlande Eldridge is a 39 y.o. female who presents today for results.  Her general medical history has been reviewed and she reports it as follows:    Past Medical History:   Diagnosis Date    COVID-19 2020    Iron deficiency anemia      Past Surgical History:   Procedure Laterality Date    AUGMENTATION MAMMAPLASTY          BREAST IMPLANT      CARDIAC CATHETERIZATION      [place] LAPAROSCOPY W/RMVL ADNEXAL STRUCTURES Bilateral 2023    Procedure: SALPINGECTOMY, LAPAROSCOPIC;  Surgeon: Yardlie Toussaint-Foster, DO;  Location: [facility];  Service: Gynecology     OB History          3    Para   2    Term   2       0    AB   1    Living   2         SAB   1    IAB   0    Ectopic   0    Multiple   0    Live Births   2               Social History     Tobacco Use    Smoking status: Never     Passive exposure: Never    Smokeless tobacco: Never   Vaping Use    Vaping status: Never Used   Substance Use Topics    Alcohol use: Not Currently    Drug use: Never     Social History     Substance and Sexual Activity   Sexual Activity Yes    Partners: Male    Birth control/protection: OCP       Current Outpatient Medications   Medication Instructions    ergocalciferol (VITAMIN D2) 50,000 Units, Oral, Weekly    naproxen (NAPROSYN) 500 mg, Oral, 2 times daily with meals       History of Present Illness:   Patient presents for follow up s/p colposcopy with no new complaints.    Review of Systems:  Review of Systems   Genitourinary:  Negative for pelvic pain, vaginal bleeding and vaginal discharge.   All other systems reviewed and are negative.      Physical Exam:  /76 (BP Location: Left arm, Patient Position: Sitting, Cuff Size: Standard)   Pulse 70   Ht 5' 5\" (1.651 m)   Wt 81.3 kg (179 lb 3.2 oz)   LMP 2024 (Approximate)   BMI 29.82 kg/m²   Physical Exam  Constitutional:       Appearance: Normal appearance.   Neurological:      Mental Status: She is alert.   Vitals " reviewed.         Discussion:  Discuss with patient pathology consist of SUSAN 1 and with her history of persistent HPV 16 pos for now 2yrs recommend a LEEP.  Risk and benefits discussed.  Consent signed. Patient is aware that she will get valium the day prior to the procedure and that she would need a .     Assessment:   1. 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 SUSAN 1    Plan:   1. Consent signed for LEEP   2. LEEP information given in [place]   3. Return to office 8/9 at 2pm   4. Patient's depression screening was assessed with a PHQ-2 score of 0. Their PHQ-9 score was 3. Clinically patient does not have depression. No treatment is required.     Reviewed with patient that test results [email or web address] immediately, but that they will not necessarily be reviewed by me immediately.  Explained that I will review results at my earliest opportunity and contact patient appropriately.

## 2024-07-16 ENCOUNTER — OFFICE VISIT (OUTPATIENT)
Dept: OBGYN CLINIC | Facility: CLINIC | Age: 39
End: 2024-07-16

## 2024-07-16 VITALS
DIASTOLIC BLOOD PRESSURE: 79 MMHG | HEIGHT: 65 IN | HEART RATE: 64 BPM | SYSTOLIC BLOOD PRESSURE: 119 MMHG | WEIGHT: 180.4 LBS | BODY MASS INDEX: 30.06 KG/M2

## 2024-07-16 DIAGNOSIS — R87.810 HUMAN PAPILLOMAVIRUS (HPV) TYPE 16 DNA DETECTED IN CERVICAL SPECIMEN: Primary | ICD-10-CM

## 2024-07-16 PROCEDURE — 99213 OFFICE O/P EST LOW 20 MIN: CPT | Performed by: OBSTETRICS & GYNECOLOGY

## 2024-07-16 RX ORDER — MISOPROSTOL 200 UG/1
TABLET ORAL
Qty: 2 TABLET | Refills: 0 | Status: SHIPPED | OUTPATIENT
Start: 2024-07-16

## 2024-07-16 NOTE — PROGRESS NOTES
"PROBLEM GYNECOLOGICAL VISIT    Mirlande Eldridge is a 39 y.o. female who presents today with her  for surgical consultation.  Her general medical history has been reviewed and she reports it as follows:    Past Medical History:   Diagnosis Date    COVID-19 2020    Iron deficiency anemia      Past Surgical History:   Procedure Laterality Date    AUGMENTATION MAMMAPLASTY      2013    BREAST IMPLANT      CARDIAC CATHETERIZATION      [place] LAPAROSCOPY W/RMVL ADNEXAL STRUCTURES Bilateral 2023    Procedure: SALPINGECTOMY, LAPAROSCOPIC;  Surgeon: Yardlie Toussaint-Foster, DO;  Location: [facility];  Service: Gynecology     OB History          3    Para   2    Term   2       0    AB   1    Living   2         SAB   1    IAB   0    Ectopic   0    Multiple   0    Live Births   2               Social History     Tobacco Use    Smoking status: Never     Passive exposure: Never    Smokeless tobacco: Never   Vaping Use    Vaping status: Never Used   Substance Use Topics    Alcohol use: Not Currently    Drug use: Never     Social History     Substance and Sexual Activity   Sexual Activity Yes    Partners: Male    Birth control/protection: Female Sterilization       Current Outpatient Medications   Medication Instructions    ergocalciferol (VITAMIN D2) 50,000 Units, Oral, Weekly    naproxen (NAPROSYN) 500 mg, Oral, 2 times daily with meals       History of Present Illness:   Patient presents with her  to discuss definitive treatment for persistent HPV 16 positive pap smear.    Review of Systems:  Review of Systems   Genitourinary:  Negative for pelvic pain, vaginal bleeding and vaginal discharge.   All other systems reviewed and are negative.      Physical Exam:  /79 (BP Location: Left arm, Patient Position: Sitting, Cuff Size: Standard)   Pulse 64   Ht 5' 5\" (1.651 m)   Wt 81.8 kg (180 lb 6.4 oz)   LMP 2024 (Exact Date)   BMI 30.02 kg/m²   Physical Exam  Constitutional:       " Appearance: Normal appearance.   Neurological:      Mental Status: She is alert.   Vitals reviewed.         Discussion:  Discuss with patient and her spouse prior to having definitive surgery via hysterectomy will need EMB to r/o any uterine pathology. Patient will keep her LEEP appt. Discuss risk and benefits of surgery. Consent signed for EUA and STEPHANIE BS.        Assessment:   1. 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 Consent signed for hysterectomy   2. Return to office 2wks for EMB.       Reviewed with patient that [facility] results [facility] immediately, but that they will not necessarily be reviewed by me immediately.  Explained that I will review results at my earliest opportunity and contact patient appropriately.

## 2024-07-17 ENCOUNTER — TELEPHONE (OUTPATIENT)
Dept: OBGYN CLINIC | Facility: CLINIC | Age: 39
End: 2024-07-17

## 2024-07-17 NOTE — TELEPHONE ENCOUNTER
----- Message from Yardlie Toussaint-Foster, DO sent at 2024  7:25 PM EDT -----  [facility] GYN Department  Surgery Scheduling Sheet    Patient Name: Mirlande Eldridge  : 1985    Provider: Yardlie Toussaint-Foster, DO     Needed: yes; Language: Croatian    Procedure: exam under anesthesia, total laparoscopic hysterectomy, and bilateral salpingectomy    Diagnosis: Persistent HPV 16 positive and Cervical dysplasia    Special Needs or Equipment: none    Anesthesia: General anesthesia    Length of stay: outpatient  Does patient have comorbid conditions that will require close perioperative monitoring prior to safe discharge: no    The patient has comorbid conditions that will require close perioperative monitoring prior to safe discharge, including N/A.   This may require acute care beyond the usual and routine recovery period. As such, inpatient admission post-operatively is expected and appropriate, and anticipated hospital length of stay will be >2 midnights.    Pre-Admission Testing Needed: yes   Labs that should be ordered: cbc, type and screen, and BMP    Order PAT that is recommended in prep for procedure?: Yes    Medical Clearance Needed: no; Provider: N/A    MA Form Signed (tubals/hysterectomy): Not Indicated    Surgical Drink Given: no     How many days out of work: 6 week(s)     How many days no drivin week(s)       Is pre op appt needed?  yes  Interval for post op appt: 2 week(s)     For Surgical Scheduler:     Surgery Scheduled On:  [place]: [facility]    Pre-op Appt:   Post op Appt:  Consult/Medical clearance appt:

## 2024-07-30 ENCOUNTER — PROCEDURE VISIT (OUTPATIENT)
Dept: OBGYN CLINIC | Facility: CLINIC | Age: 39
End: 2024-07-30

## 2024-07-30 VITALS
HEIGHT: 65 IN | WEIGHT: 182.2 LBS | HEART RATE: 68 BPM | BODY MASS INDEX: 30.35 KG/M2 | SYSTOLIC BLOOD PRESSURE: 115 MMHG | DIASTOLIC BLOOD PRESSURE: 75 MMHG

## 2024-07-30 DIAGNOSIS — R87.810 HUMAN PAPILLOMAVIRUS (HPV) TYPE 16 DNA DETECTED IN CERVICAL SPECIMEN: Primary | ICD-10-CM

## 2024-07-30 LAB — SL AMB POCT URINE HCG: NEGATIVE

## 2024-07-30 PROCEDURE — 88305 TISSUE EXAM BY PATHOLOGIST: CPT | Performed by: STUDENT IN AN ORGANIZED HEALTH CARE EDUCATION/TRAINING PROGRAM

## 2024-07-30 PROCEDURE — 81025 URINE PREGNANCY TEST: CPT | Performed by: OBSTETRICS & GYNECOLOGY

## 2024-07-30 PROCEDURE — 58100 BIOPSY OF UTERUS LINING: CPT | Performed by: OBSTETRICS & GYNECOLOGY

## 2024-07-30 NOTE — PROGRESS NOTES
"Endometrial biopsy    Date/Time: 7/30/2024 11:45 AM    Performed by: Yardlie Toussaint-Foster, DO  Authorized by: Yardlie Toussaint-Foster, DO  Universal Protocol:  Consent: Verbal consent obtained. Written consent obtained.  Risks and benefits: risks, benefits and alternatives were discussed  Consent given by: patient  Time out: Immediately prior to procedure a \"time out\" was called to verify the correct patient, procedure, equipment, support staff and site/side marked as required.  Timeout called at: 7/30/2024 11:58 AM.  Patient understanding: patient states understanding of the procedure being performed  Patient consent: the patient's understanding of the procedure matches consent given  Procedure consent: procedure consent matches procedure scheduled  Relevant documents: relevant documents present and verified  Test results: test results available and properly labeled  Radiology Images displayed and confirmed. If images not available, report reviewed: imaging studies available  Patient identity confirmed: verbally with patient and provided demographic data    Indication:     Indications comment:  Cervical dysplasia  Procedure:     Procedure: endometrial biopsy with [place]      A bivalve speculum was placed in the vagina: yes      Cervix cleaned and prepped: yes      The cervix was dilated: no      Uterus sounded: yes      Uterus sound depth (cm):  8    Specimen collected: specimen collected and sent to pathology      Patient tolerated procedure well with no complications: yes       "

## 2024-08-01 PROCEDURE — 88305 TISSUE EXAM BY PATHOLOGIST: CPT | Performed by: STUDENT IN AN ORGANIZED HEALTH CARE EDUCATION/TRAINING PROGRAM

## 2024-08-08 ENCOUNTER — TELEPHONE (OUTPATIENT)
Dept: OBGYN CLINIC | Facility: CLINIC | Age: 39
End: 2024-08-08

## 2024-08-08 DIAGNOSIS — R87.810 HUMAN PAPILLOMAVIRUS (HPV) TYPE 16 DNA DETECTED IN CERVICAL SPECIMEN: Primary | ICD-10-CM

## 2024-08-08 RX ORDER — DIAZEPAM 10 MG
TABLET ORAL
Qty: 1 TABLET | Refills: 0 | Status: SHIPPED | OUTPATIENT
Start: 2024-08-08

## 2024-08-09 ENCOUNTER — PROCEDURE VISIT (OUTPATIENT)
Dept: OBGYN CLINIC | Facility: CLINIC | Age: 39
End: 2024-08-09

## 2024-08-09 VITALS
HEIGHT: 65 IN | SYSTOLIC BLOOD PRESSURE: 124 MMHG | DIASTOLIC BLOOD PRESSURE: 76 MMHG | BODY MASS INDEX: 29.66 KG/M2 | WEIGHT: 178 LBS | HEART RATE: 73 BPM

## 2024-08-09 DIAGNOSIS — R87.810 HUMAN PAPILLOMAVIRUS (HPV) TYPE 16 DNA DETECTED IN CERVICAL SPECIMEN: Primary | ICD-10-CM

## 2024-08-09 DIAGNOSIS — N87.0 DYSPLASIA OF CERVIX, LOW GRADE (CIN 1): ICD-10-CM

## 2024-08-09 PROCEDURE — 57522 CONIZATION OF CERVIX: CPT | Performed by: OBSTETRICS & GYNECOLOGY

## 2024-08-09 PROCEDURE — 88307 TISSUE EXAM BY PATHOLOGIST: CPT | Performed by: SPECIALIST

## 2024-08-09 PROCEDURE — 81025 URINE PREGNANCY TEST: CPT | Performed by: OBSTETRICS & GYNECOLOGY

## 2024-08-09 PROCEDURE — 88344 IMHCHEM/IMCYTCHM EA MLT ANTB: CPT | Performed by: SPECIALIST

## 2024-08-09 RX ORDER — LIDOCAINE HYDROCHLORIDE AND EPINEPHRINE BITARTRATE 20; .01 MG/ML; MG/ML
10 INJECTION, SOLUTION SUBCUTANEOUS ONCE
Status: COMPLETED | OUTPATIENT
Start: 2024-08-09 | End: 2024-08-09

## 2024-08-09 RX ADMIN — LIDOCAINE HYDROCHLORIDE AND EPINEPHRINE BITARTRATE 10 ML: 20; .01 INJECTION, SOLUTION SUBCUTANEOUS at 14:53

## 2024-08-09 NOTE — PROGRESS NOTES
Indications for the procedure:   History: Ms Mirlande Eldridge is a 39 y.o. year old  with a Pap smear showing negative with  HPV 16 positive persistent and a colposcopy showing SUSAN 1.    Surgical risks: The patient was informed of all the risks and benefits of a loop electrosurgical excision procedure.  Risks included but were not limited to bleeding, infection, injury to the vulva, vagina, cervix, uterine perforation, or negative effects on future pregnancy outcomes.  The patient expressed understanding the risks involved, all portions were answered, the patient was consented to the procedure.    Description of the procedure:    The patient was taken to the procedure room. Patient was prescribed 10mg of valium to be taken 2 hours prior to procedure.  The patient was then placed in dorsolithotomy position and draped.    Operative technique: A timeout was performed to confirm correct patient and correct procedure.  A bivalved coated speculum was  inserted into the vagina and the cervix was visualized. The cervix was prepped with acetic acid local anesthesia of 2% lidocaine with epinephrine was injected circumferential of a total of 10cc. The [id number] loop electrode was selected and used to excise the cervical sample. The gross specimen was removed and sent to pathology.  Ball electrocautery was used to obtain adequate hemostasis. Monsel's was then applied to maintain hemostasis. Good hemostasis was confirmed. The bivalve speculum was  removed from the vagina. All instrument count was correct.    Patient tolerated the procedure well.

## 2024-08-12 LAB — SL AMB POCT URINE HCG: NEGATIVE

## 2024-08-15 PROCEDURE — 88344 IMHCHEM/IMCYTCHM EA MLT ANTB: CPT | Performed by: SPECIALIST

## 2024-08-15 PROCEDURE — 88307 TISSUE EXAM BY PATHOLOGIST: CPT | Performed by: SPECIALIST

## 2024-08-26 ENCOUNTER — OFFICE VISIT (OUTPATIENT)
Dept: OBGYN CLINIC | Facility: CLINIC | Age: 39
End: 2024-08-26

## 2024-08-26 VITALS
HEIGHT: 65 IN | SYSTOLIC BLOOD PRESSURE: 124 MMHG | DIASTOLIC BLOOD PRESSURE: 77 MMHG | BODY MASS INDEX: 30.56 KG/M2 | WEIGHT: 183.4 LBS | HEART RATE: 79 BPM

## 2024-08-26 DIAGNOSIS — Z98.890 S/P LEEP: ICD-10-CM

## 2024-08-26 DIAGNOSIS — Z09 POSTOP CHECK: Primary | ICD-10-CM

## 2024-08-26 DIAGNOSIS — N87.0 DYSPLASIA OF CERVIX, LOW GRADE (CIN 1): ICD-10-CM

## 2024-08-26 PROBLEM — Z30.09 UNWANTED FERTILITY: Status: RESOLVED | Noted: 2023-06-15 | Resolved: 2024-08-26

## 2024-08-26 PROCEDURE — 99024 POSTOP FOLLOW-UP VISIT: CPT | Performed by: OBSTETRICS & GYNECOLOGY

## 2024-08-26 RX ORDER — METRONIDAZOLE 500 MG/1
500 TABLET ORAL EVERY 12 HOURS SCHEDULED
Qty: 14 TABLET | Refills: 0 | Status: SHIPPED | OUTPATIENT
Start: 2024-08-26 | End: 2024-09-02

## 2024-08-26 NOTE — PROGRESS NOTES
"Subjective     Mirlande Eldridge is a 39 y.o. female who presents to the clinic 3 weeks status post  in-office LEEP  for  Cervical dysplasia and HPV 16 positive  with c/o brownish discharge with a slight odor.  Eating a regular diet without difficulty. Bowel movements are normal. The patient is not having any pain.    The following portions of the patient's history were reviewed and updated as appropriate: allergies, current medications, past family history, past medical history, past social history, past surgical history, and problem list.    Review of Systems  Pertinent items are noted in HPI.      Objective     /77 (BP Location: Left arm, Patient Position: Sitting, Cuff Size: Standard)   Pulse 79   Ht 5' 5\" (1.651 m)   Wt 83.2 kg (183 lb 6.4 oz)   LMP 08/16/2024 (Exact Date)   BMI 30.52 kg/m²   General:  alert and oriented, in no acute distress   Abdomen: soft, bowel sounds active, non-tender   Cervix:   healing well, positive brownish discharge noted         Assessment      Doing well postoperatively.   Pathology report discussed.      Plan     1. Continue any current medications  2 Activity restrictions:  As stated on postop instructions  3. Anticipated return to work: not applicable.  4. Flagyl escribed     "

## 2024-10-14 ENCOUNTER — OFFICE VISIT (OUTPATIENT)
Dept: FAMILY MEDICINE CLINIC | Facility: CLINIC | Age: 39
End: 2024-10-14

## 2024-10-14 ENCOUNTER — APPOINTMENT (OUTPATIENT)
Dept: LAB | Facility: CLINIC | Age: 39
End: 2024-10-14

## 2024-10-14 VITALS
SYSTOLIC BLOOD PRESSURE: 110 MMHG | HEIGHT: 65 IN | TEMPERATURE: 98 F | WEIGHT: 181 LBS | DIASTOLIC BLOOD PRESSURE: 70 MMHG | BODY MASS INDEX: 30.16 KG/M2 | OXYGEN SATURATION: 98 % | RESPIRATION RATE: 16 BRPM | HEART RATE: 61 BPM

## 2024-10-14 DIAGNOSIS — F41.9 ANXIETY: Primary | ICD-10-CM

## 2024-10-14 DIAGNOSIS — E66.9 OBESITY (BMI 30-39.9): ICD-10-CM

## 2024-10-14 DIAGNOSIS — E55.9 VITAMIN D DEFICIENCY: ICD-10-CM

## 2024-10-14 DIAGNOSIS — E66.3 OVERWEIGHT (BMI 25.0-29.9): ICD-10-CM

## 2024-10-14 PROBLEM — R94.31 ABNORMAL EKG: Status: RESOLVED | Noted: 2024-01-01 | Resolved: 2024-10-14

## 2024-10-14 LAB
CHOLEST SERPL-MCNC: 184 MG/DL
EST. AVERAGE GLUCOSE BLD GHB EST-MCNC: 105 MG/DL
HBA1C MFR BLD: 5.3 %
HDLC SERPL-MCNC: 43 MG/DL
LDLC SERPL CALC-MCNC: 109 MG/DL (ref 0–100)
TRIGL SERPL-MCNC: 162 MG/DL

## 2024-10-14 PROCEDURE — 86762 RUBELLA ANTIBODY: CPT

## 2024-10-14 PROCEDURE — 36415 COLL VENOUS BLD VENIPUNCTURE: CPT

## 2024-10-14 PROCEDURE — 86765 RUBEOLA ANTIBODY: CPT

## 2024-10-14 PROCEDURE — 99214 OFFICE O/P EST MOD 30 MIN: CPT

## 2024-10-14 PROCEDURE — 80061 LIPID PANEL: CPT

## 2024-10-14 PROCEDURE — 86735 MUMPS ANTIBODY: CPT

## 2024-10-14 PROCEDURE — 83036 HEMOGLOBIN GLYCOSYLATED A1C: CPT

## 2024-10-14 NOTE — PROGRESS NOTES
Ambulatory Visit  Name: Mirlande Eldridge      : 1985      MRN: [id number]  Encounter Provider: OMAR Rasmussen  Encounter Date: 10/14/2024   Encounter department: [facility]    Assessment & Plan  Anxiety  REGIS-7 Flowsheet Screening      Flowsheet Row Most Recent Value   Over the last two weeks, how often have you been bothered by the following problems?     Feeling nervous, anxious, or on edge 1   Not being able to stop or control worrying 3   Worrying too much about different things 3   Trouble relaxing  3   Being so restless that it's hard to sit still 1   Becoming easily annoyed or irritable  1   Feeling afraid as if something awful might happen 0   How difficult have these problems made it for you to do your work, take care of things at home, or get along with other people?  Not difficult at all   REGIS Score  12         Declines medication management. Recommend CBT & self-care measures.  Orders:    Ambulatory referral to Psych Services; Future    Obesity (BMI 30-39.9)  Wt Readings from Last 3 Encounters:   10/14/24 82.1 kg (181 lb)   24 83.2 kg (183 lb 6.4 oz)   24 80.7 kg (178 lb)       Orders:    Lipid Panel with Direct LDL reflex; Future    Hemoglobin A1C; Future    Measles/Mumps/Rubella Immunity; Future    Vitamin D deficiency    Orders:    Cholecalciferol (VITAMIN D3) 1,000 units tablet; Take 1 tablet (1,000 Units total) by mouth daily      Depression Screening and Follow-up Plan: Patient was screened for depression during today's encounter. They screened negative with a PHQ-2 score of 0.      History of Present Illness     Mirlande Eldridge is a 39 y.o. female  has a past medical history of COVID-19 and Iron deficiency anemia.  has a past surgical history that includes Cardiac catheterization; BREAST IMPLANT; pr laparoscopy w/rmvl adnexal structures (Bilateral, 2023); and Augmentation mammaplasty.        Pt presents today to establish care  "with new pcp.. Denies any medication usage or significant PMH. Has upcoming hysterectomy on 11/1 for cervical dysplasia and hx of HPV. Pt complains of feeling increased stress with muscle tension that started 2 years ago triggered by \"many things\". Feels increased daytime fatigue and has trouble sleeping.           Review of Systems   Constitutional:  Negative for chills and fever.   HENT:  Negative for ear pain and sore throat.    Eyes:  Negative for pain and visual disturbance.   Respiratory:  Negative for cough and shortness of breath.    Cardiovascular:  Negative for chest pain and palpitations.   Gastrointestinal:  Negative for abdominal pain and vomiting.   Genitourinary:  Negative for dysuria and hematuria.   Musculoskeletal:  Negative for arthralgias and back pain.   Skin:  Negative for color change and rash.   Neurological:  Negative for seizures and syncope.   Psychiatric/Behavioral:  The patient is nervous/anxious.    All other systems reviewed and are negative.          Objective     /70 (BP Location: Right arm, Patient Position: Sitting, Cuff Size: Standard)   Pulse 61   Temp 98 °F (36.7 °C) (Temporal)   Resp 16   Ht 5' 5\" (1.651 m)   Wt 82.1 kg (181 lb)   SpO2 98%   BMI 30.12 kg/m²     Physical Exam  Vitals and nursing note reviewed.   Constitutional:       General: She is not in acute distress.     Appearance: She is well-developed.   HENT:      Head: Normocephalic and atraumatic.   Eyes:      Conjunctiva/sclera: Conjunctivae normal.   Cardiovascular:      Rate and Rhythm: Normal rate and regular rhythm.      Heart sounds: No murmur heard.  Pulmonary:      Effort: Pulmonary effort is normal. No respiratory distress.      Breath sounds: Normal breath sounds.   Abdominal:      Palpations: Abdomen is soft.      Tenderness: There is no abdominal tenderness.   Musculoskeletal:         General: No swelling.      Cervical back: Neck supple.   Skin:     General: Skin is warm and dry.      " Capillary Refill: Capillary refill takes less than 2 seconds.   Neurological:      Mental Status: She is alert.   Psychiatric:         Mood and Affect: Mood normal.

## 2024-10-14 NOTE — ASSESSMENT & PLAN NOTE
REGIS-7 Flowsheet Screening      Flowsheet Row Most Recent Value   Over the last two weeks, how often have you been bothered by the following problems?     Feeling nervous, anxious, or on edge 1   Not being able to stop or control worrying 3   Worrying too much about different things 3   Trouble relaxing  3   Being so restless that it's hard to sit still 1   Becoming easily annoyed or irritable  1   Feeling afraid as if something awful might happen 0   How difficult have these problems made it for you to do your work, take care of things at home, or get along with other people?  Not difficult at all   REGIS Score  12         Declines medication management. Recommend CBT & self-care measures.  Orders:    Ambulatory referral to Psych Services; Future

## 2024-10-14 NOTE — ASSESSMENT & PLAN NOTE
Wt Readings from Last 3 Encounters:   10/14/24 82.1 kg (181 lb)   08/26/24 83.2 kg (183 lb 6.4 oz)   08/09/24 80.7 kg (178 lb)       Orders:    Lipid Panel with Direct LDL reflex; Future    Hemoglobin A1C; Future    Measles/Mumps/Rubella Immunity; Future

## 2024-10-14 NOTE — PATIENT INSTRUCTIONS
If you would like to be added to the wait list for services within [facility], you will need to contact them directly at [facility] Outpatient Therapy and Psychiatry - [phone]  Outpatient Mental Health Resources     Suicide and Crisis Lifeline  Call or text 988  Available 24 hours    CRISIS TEXT LINE   ? Available 24/7 from anywhere in the [place]   ? Text HOME to [phone] to connect with a crisis counselor   ? Message on Bilibot (Crisis Text Line)     WARM LINE   ? [phone]   ? Accepts calls Monday-Sunday 6:00 AM-10:00 AM & 4:00 PM-12:00 AM   ? Confidential telephonic support by mental health professionals     [facility]   ? To be used for mental health emergencies and/or please go to your local Emergency Department   ? Call [phone] if you or a loved one are in a mental health crisis     CRIME VICTIMS [place]   ? [phone]   ? 24/7 Advocate Hotline/ counseling services/ court & hospital accompaniment  No insurance- all services are at free cost.     [facility] [place]   ? [phone]  ? 24/7 Advocate Hotline regarding domestic violence    QUINTEN ([employer] [place] on Mental Illness):  [place] [facility]: (155) 613-2023  www.quinten-lv.org  QUINTEN, the [employer] [place] [employer] Mental [employer]ness, is the nation's largest grassroots mental health organization dedicated to building better lives for the millions of Americans affected by mental illness    [facility]  ? At [facility], [address], [place] [address], [phone]  ? Bilingual English/Stateless Serves ages 6+  ? Accepts Medical Assistance only    [facility]   ? [address] [place] [address] [phone] or [phone]  ? Bilingual English/Stateless Serves ages 5+   ? Accepts Medical Assistance     [facility]   ? Location 1: [address] [place] [address] [phone]        ? Location 2: [address] [place] [address]   ? Bilingual English/Stateless  Serves ages 5+   ? Accepts Medical Assistance    [facility]   ? [address] [place] [address] [phone]   ? Bilingual English/Bhutanese Serves ages 14+   ? Accepts Medical Assistance, Medicare, & Major Commercial Insurances    [facility]   ? [address] [place] [address] [phone]  ? Bilingual English/Bhutanese Serves ages 5+   ? Accepts Medical Assistance     [facility]   ? [address] [place] [address] [phone]        ? Bilingual English/Bhutanese Serves ages 6+   ? Accepts Medical Assistance & Commercial Insurance     [facility]   ? [address] [place] [address] [phone]  ? Bilingual English/Bhutanese Serves ages 3+   ? Accepts Medical Assistance & Some Commercial Insurance     [facility]   ? Location 1: [address] [place] [address] [phone]   ? Location 2: [address] [facility], [address] [phone]        ? English only* Serves ages 4+         ? Does NOT currently accept Medical Assistance: ONLY Commercial Insurance    [facility] OUTPATIENT MENTAL HEALTH  The [facility] will provide funding for outpatient Mental Health services to individuals that do not have insurance coverage for these services. We will fund the psychiatrist and therapist, but we are unable to cover costs of medications.  Registration for outpatient services for individuals:  You must be 18 years old  You must have no insurance  You must live in [facility]  Paperwork is done via phone. The individual seeking services can call our inday line: [phone] from 8-4pm.    [email or web address] is a resource to find psychotherapy providers, patients can filter therapist search list based on a number of criteria including language, specialty, gender, insurance, etc. Individuals seeking will need to reach out to perspective providers through information in the directory. You are encouraged to contact multiple providers to given that many  providers have a significant wait list for services as well as to find a provider is a good fit for you!      For any additional questions or concerns you may contact one of the Social Work Care Managers or the Office for assistance.    Magalie Betancourt MSW [phone]   Rafaelina Reyes, MSW [phone]   Maxine Brantley MSW  [phone]   [facility] [phone]

## 2024-10-15 LAB
MEV IGG SER IA-ACNC: <13.5 AU/ML
MUV IGG SER IA-ACNC: 70 AU/ML
RUBV IGG SERPL IA-ACNC: 6.18 INDEX

## 2024-10-16 ENCOUNTER — OFFICE VISIT (OUTPATIENT)
Dept: OBGYN CLINIC | Facility: CLINIC | Age: 39
End: 2024-10-16

## 2024-10-16 VITALS
HEIGHT: 65 IN | BODY MASS INDEX: 30.69 KG/M2 | HEART RATE: 73 BPM | DIASTOLIC BLOOD PRESSURE: 74 MMHG | SYSTOLIC BLOOD PRESSURE: 116 MMHG | WEIGHT: 184.2 LBS

## 2024-10-16 DIAGNOSIS — N87.0 DYSPLASIA OF CERVIX, LOW GRADE (CIN 1): ICD-10-CM

## 2024-10-16 DIAGNOSIS — R87.810 HUMAN PAPILLOMAVIRUS (HPV) TYPE 16 DNA DETECTED IN CERVICAL SPECIMEN: ICD-10-CM

## 2024-10-16 DIAGNOSIS — Z01.818 PREOPERATIVE EXAMINATION: Primary | ICD-10-CM

## 2024-10-16 PROCEDURE — 99213 OFFICE O/P EST LOW 20 MIN: CPT | Performed by: OBSTETRICS & GYNECOLOGY

## 2024-10-16 NOTE — PROGRESS NOTES
Assessment Mirlande was seen today for pre-op exam.    Diagnoses and all orders for this visit:    Preoperative examination    Human papillomavirus (HPV) type 16 DNA detected in cervical specimen    Dysplasia of cervix, low grade (SUSAN 1)         Plan    Mirlande Eldridge is scheduled for  EUA, TLH BS  on 24. Pre-op instructions, including showering with Hibiclens, discussed with patient and patient received paper copy.     The risks, benefits and alternatives to the procedure were discussed.  We discussed the risks of pain, bleeding, blood clot, infection, allergic reaction, neurovascular injury, injury to uterus, injury to surrounding structures such as bowel, bladder and/or ureters, and possibility of inability to complete the procedure.  We discussed code status - full code.  Blood transfusion is acceptable.  We discussed resident physician participation in the procedure, including pelvic exam.  All questions answered, consent obtained.        Subjective     Mirlande Eldridge is a 39 y.o. female here for a pre-op consultation. She is without complaint today.       Patient Active Problem List   Diagnosis    Overweight (BMI 25.0-29.9)    Status post cardiac catheterization    Chronic tension-type headache, not intractable    Pure hypercholesterolemia    Human papillomavirus (HPV) type 16 DNA detected in cervical specimen    S/P LEEP    Dysplasia of cervix, low grade (SUSAN 1)    Anxiety         Gynecologic History  Patient's last menstrual period was 10/12/2024 (exact date).  Contraception: condoms  Last Pap: 23. Results were: negative w/ HPV 16 positive      Obstetric History  OB History    Para Term  AB Living   3 2 2 0 1 2   SAB IAB Ectopic Multiple Live Births   1 0 0 0 2      # Outcome Date GA Lbr Raf/2nd Weight Sex Type Anes PTL Lv   3 Term 14 42w0d  4082 g (9 lb) M Vag-Spont EPI     2 Term 11 38w0d  3317 g (7 lb 5 oz) F Vag-Spont EPI  THEODORE   1 SAB                Past  Medical/Surgical/Family/Social History    Past Medical History:   Diagnosis Date    COVID-19 12/2020    Iron deficiency anemia      Past Surgical History:   Procedure Laterality Date    AUGMENTATION MAMMAPLASTY      2013    BREAST IMPLANT      CARDIAC CATHETERIZATION      SC LAPAROSCOPY W/RMVL ADNEXAL STRUCTURES Bilateral 06/16/2023    Procedure: SALPINGECTOMY, LAPAROSCOPIC;  Surgeon: Yardlie Toussaint-Foster, DO;  Location: [facility];  Service: Gynecology     Family History   Problem Relation Age of Onset    Hypertension Mother     Hypertension Father     Diabetes Father     No Known Problems Sister     No Known Problems Sister     No Known Problems Daughter     Breast cancer Paternal Grandmother         age unknown    Colon cancer Neg Hx     Cancer Neg Hx     Ovarian cancer Neg Hx      Social History     Socioeconomic History    Marital status: Single     Spouse name: Not on file    Number of children: 2    Years of education: 12    Highest education level: High school graduate   Occupational History    Not on file   Tobacco Use    Smoking status: Never     Passive exposure: Never    Smokeless tobacco: Never   Vaping Use    Vaping status: Never Used   Substance and Sexual Activity    Alcohol use: Not Currently    Drug use: Never    Sexual activity: Yes     Partners: Male     Birth control/protection: Female Sterilization   Other Topics Concern    Not on file   Social History Narrative    Not on file     Social Determinants of Health     Financial Resource Strain: Low Risk  (10/16/2024)    Overall Financial Resource Strain (CARDIA)     Difficulty of Paying Living Expenses: Not hard at all   Food Insecurity: No Food Insecurity (8/26/2024)    Hunger Vital Sign     Worried About Running Out of Food in the Last Year: Never true     Ran Out of Food in the Last Year: Never true   Transportation Needs: No Transportation Needs (10/16/2024)    PRAPARE - Transportation     Lack of Transportation (Medical): No     Lack of  "Transportation (Non-Medical): No   Physical Activity: Not on file   Stress: No Stress Concern Present (4/15/2024)    [place] [employer] - Occupational Stress Questionnaire     Feeling of Stress : Only a little   Social Connections: Not on file   Intimate Partner Violence: Not on file   Housing Stability: Low Risk  (10/16/2024)    Housing Stability Vital Sign     Unable to Pay for Housing in the Last Year: No     Number of Times Moved in the Last Year: 1     Homeless in the Last Year: No         Patient has no known allergies.    Current Outpatient Medications:     Cholecalciferol (VITAMIN D3) 1,000 units tablet, Take 1 tablet (1,000 Units total) by mouth daily, Disp: 120 tablet, Rfl: 0      Review of Systems  Constitutional: no fever, feels well  CV: No complaints of chest pain, palpitations  Pulm: No shortness of breath or dyspnea on exertion  Gastrointestinal: no complaints of abdominal pain, nausea  Genitourinary : no complaints of dysuria, vaginal discharge       Objective     /74 (BP Location: Left arm, Patient Position: Sitting, Cuff Size: Large)   Pulse 73   Ht 5' 5\" (1.651 m)   Wt 83.6 kg (184 lb 3.2 oz)   LMP 10/12/2024 (Exact Date)   BMI 30.65 kg/m²     GEN: The patient was alert and oriented x3, pleasant well-appearing female in no acute distress.   CV: Regular rate and rhythm  PULM: nonlabored respirations, CTAB  ABD: BS positive, soft, nontender  : deferred  EXT: No calf tenderness  MSK: Normal gait  Skin: warm, dry  Neuro: no focal deficits  Psych: normal affect and judgement, cooperative   "

## 2024-10-17 ENCOUNTER — TELEPHONE (OUTPATIENT)
Dept: OBGYN CLINIC | Facility: CLINIC | Age: 39
End: 2024-10-17

## 2024-10-23 ENCOUNTER — TELEPHONE (OUTPATIENT)
Age: 39
End: 2024-10-23

## 2024-10-23 NOTE — TELEPHONE ENCOUNTER
Contacted patient in regards to Routine Referral in attempts to verify patient's needs of services and add patient to proper wait list. Called patient with interpretation line and spoke with patient whom stated is interested in being added to talk therapy wait list. Pt added at this time.     Language line [phone]

## 2024-11-01 ENCOUNTER — TELEPHONE (OUTPATIENT)
Dept: OBGYN CLINIC | Facility: CLINIC | Age: 39
End: 2024-11-01

## 2024-11-01 NOTE — TELEPHONE ENCOUNTER
Voicemail: My name is Mirlande Eldridge can you give me a call at [phone].      Spoke with patient and stated surgery was cancelled because she ate breakfast. Patient would like to know when is the next surgery date.

## 2025-01-22 ENCOUNTER — CONSULT (OUTPATIENT)
Dept: OBGYN CLINIC | Facility: CLINIC | Age: 40
End: 2025-01-22

## 2025-01-22 VITALS
BODY MASS INDEX: 31.06 KG/M2 | SYSTOLIC BLOOD PRESSURE: 114 MMHG | WEIGHT: 186.4 LBS | HEIGHT: 65 IN | DIASTOLIC BLOOD PRESSURE: 70 MMHG

## 2025-01-22 DIAGNOSIS — N87.0 DYSPLASIA OF CERVIX, LOW GRADE (CIN 1): ICD-10-CM

## 2025-01-22 DIAGNOSIS — R87.810 HUMAN PAPILLOMAVIRUS (HPV) TYPE 16 DNA DETECTED IN CERVICAL SPECIMEN: ICD-10-CM

## 2025-01-22 DIAGNOSIS — Z98.890 S/P LEEP: ICD-10-CM

## 2025-01-22 DIAGNOSIS — Z01.818 PREOP EXAMINATION: Primary | ICD-10-CM

## 2025-01-22 PROCEDURE — 99213 OFFICE O/P EST LOW 20 MIN: CPT | Performed by: OBSTETRICS & GYNECOLOGY

## 2025-01-31 ENCOUNTER — CLINICAL SUPPORT (OUTPATIENT)
Dept: FAMILY MEDICINE CLINIC | Facility: CLINIC | Age: 40
End: 2025-01-31

## 2025-01-31 ENCOUNTER — ANESTHESIA EVENT (OUTPATIENT)
Dept: PERIOP | Facility: HOSPITAL | Age: 40
End: 2025-01-31
Payer: COMMERCIAL

## 2025-01-31 DIAGNOSIS — Z23 ENCOUNTER FOR IMMUNIZATION: Primary | ICD-10-CM

## 2025-01-31 PROCEDURE — 90471 IMMUNIZATION ADMIN: CPT

## 2025-01-31 PROCEDURE — 90746 HEPB VACCINE 3 DOSE ADULT IM: CPT

## 2025-01-31 NOTE — PROGRESS NOTES
Pt here for hep b second dose advise to come back in 4 months from today for third and last dose.

## 2025-02-06 NOTE — ANESTHESIA PREPROCEDURE EVALUATION
Procedure:  (LTH) W/ BILATERAL SALPINGECTOMY (Bilateral: Abdomen)    TTE: LVEF 61-65%, RV wnl  ECG: NSR cannot rule out septal infarct     Prior Ga Mac 3 G1v  Relevant Problems   CARDIO   (+) Pure hypercholesterolemia      GI/HEPATIC (within normal limits)      /RENAL (within normal limits)      GYN  Breast surgery       MUSCULOSKELETAL (within normal limits)      NEURO/PSYCH   (+) Anxiety   (+) Chronic tension-type headache, not intractable      PULMONARY (within normal limits)      Obstetrics/Gynecology   (+) Dysplasia of cervix, low grade (SUSAN 1)   (+) S/P LEEP      Other   (+) Status post cardiac catheterization        Physical Exam    Airway    Mallampati score: II         Dental   No notable dental hx     Cardiovascular  Cardiovascular exam normal    Pulmonary  Pulmonary exam normal Breath sounds clear to auscultation    Other Findings  post-pubertal.      Anesthesia Plan  ASA Score- 2     Anesthesia Type- general with ASA Monitors.         Additional Monitors:     Airway Plan: ETT.    Comment: 2 nd IV after induction     TAP block if needed for post op pain management     Labs pending .       Plan Factors-    Chart reviewed.   Existing labs reviewed. Patient summary reviewed.    Patient is not a current smoker.              Induction- intravenous.    Postoperative Plan-         Informed Consent- Anesthetic plan and risks discussed with patient.        NPO Status:  No vitals data found for the desired time range.

## 2025-02-07 ENCOUNTER — ANESTHESIA (OUTPATIENT)
Dept: PERIOP | Facility: HOSPITAL | Age: 40
End: 2025-02-07
Payer: COMMERCIAL

## 2025-02-07 ENCOUNTER — HOSPITAL ENCOUNTER (OUTPATIENT)
Facility: HOSPITAL | Age: 40
Setting detail: OUTPATIENT SURGERY
Discharge: HOME | End: 2025-02-07
Attending: OBSTETRICS & GYNECOLOGY | Admitting: OBSTETRICS & GYNECOLOGY
Payer: COMMERCIAL

## 2025-02-07 VITALS
WEIGHT: 180.12 LBS | OXYGEN SATURATION: 99 % | BODY MASS INDEX: 30.01 KG/M2 | DIASTOLIC BLOOD PRESSURE: 74 MMHG | RESPIRATION RATE: 14 BRPM | SYSTOLIC BLOOD PRESSURE: 132 MMHG | TEMPERATURE: 97.6 F | HEART RATE: 73 BPM | HEIGHT: 65 IN

## 2025-02-07 DIAGNOSIS — Z90.710 S/P LAPAROSCOPIC HYSTERECTOMY: Primary | ICD-10-CM

## 2025-02-07 DIAGNOSIS — N87.9 DYSPLASIA OF CERVIX: ICD-10-CM

## 2025-02-07 DIAGNOSIS — R87.810 CERVICAL HIGH RISK HPV (HUMAN PAPILLOMAVIRUS) TEST POSITIVE: ICD-10-CM

## 2025-02-07 LAB
ABO GROUP BLD: NORMAL
ABO GROUP BLD: NORMAL
BASOPHILS # BLD AUTO: 0.08 THOUSANDS/ΜL (ref 0–0.1)
BASOPHILS NFR BLD AUTO: 1 % (ref 0–1)
BLD GP AB SCN SERPL QL: NEGATIVE
EOSINOPHIL # BLD AUTO: 0.33 THOUSAND/ΜL (ref 0–0.61)
EOSINOPHIL NFR BLD AUTO: 5 % (ref 0–6)
ERYTHROCYTE [DISTWIDTH] IN BLOOD BY AUTOMATED COUNT: 12.1 % (ref 11.6–15.1)
EXT PREGNANCY TEST URINE: NEGATIVE
EXT. CONTROL: NORMAL
HCT VFR BLD AUTO: 38.8 % (ref 34.8–46.1)
HGB BLD-MCNC: 12.4 G/DL (ref 11.5–15.4)
IMM GRANULOCYTES # BLD AUTO: 0.02 THOUSAND/UL (ref 0–0.2)
IMM GRANULOCYTES NFR BLD AUTO: 0 % (ref 0–2)
LYMPHOCYTES # BLD AUTO: 2.72 THOUSANDS/ΜL (ref 0.6–4.47)
LYMPHOCYTES NFR BLD AUTO: 41 % (ref 14–44)
MCH RBC QN AUTO: 28.4 PG (ref 26.8–34.3)
MCHC RBC AUTO-ENTMCNC: 32 G/DL (ref 31.4–37.4)
MCV RBC AUTO: 89 FL (ref 82–98)
MONOCYTES # BLD AUTO: 0.76 THOUSAND/ΜL (ref 0.17–1.22)
MONOCYTES NFR BLD AUTO: 11 % (ref 4–12)
NEUTROPHILS # BLD AUTO: 2.78 THOUSANDS/ΜL (ref 1.85–7.62)
NEUTS SEG NFR BLD AUTO: 42 % (ref 43–75)
NRBC BLD AUTO-RTO: 0 /100 WBCS
PLATELET # BLD AUTO: 346 THOUSANDS/UL (ref 149–390)
PMV BLD AUTO: 9 FL (ref 8.9–12.7)
RBC # BLD AUTO: 4.36 MILLION/UL (ref 3.81–5.12)
RH BLD: POSITIVE
RH BLD: POSITIVE
SPECIMEN EXPIRATION DATE: NORMAL
WBC # BLD AUTO: 6.69 THOUSAND/UL (ref 4.31–10.16)

## 2025-02-07 PROCEDURE — 58571 TLH W/T/O 250 G OR LESS: CPT | Performed by: OBSTETRICS & GYNECOLOGY

## 2025-02-07 PROCEDURE — 86901 BLOOD TYPING SEROLOGIC RH(D): CPT | Performed by: OBSTETRICS & GYNECOLOGY

## 2025-02-07 PROCEDURE — 86900 BLOOD TYPING SEROLOGIC ABO: CPT | Performed by: OBSTETRICS & GYNECOLOGY

## 2025-02-07 PROCEDURE — 81025 URINE PREGNANCY TEST: CPT | Performed by: OBSTETRICS & GYNECOLOGY

## 2025-02-07 PROCEDURE — 85025 COMPLETE CBC W/AUTO DIFF WBC: CPT | Performed by: OBSTETRICS & GYNECOLOGY

## 2025-02-07 PROCEDURE — 88342 IMHCHEM/IMCYTCHM 1ST ANTB: CPT | Performed by: STUDENT IN AN ORGANIZED HEALTH CARE EDUCATION/TRAINING PROGRAM

## 2025-02-07 PROCEDURE — 86850 RBC ANTIBODY SCREEN: CPT | Performed by: OBSTETRICS & GYNECOLOGY

## 2025-02-07 PROCEDURE — 88309 TISSUE EXAM BY PATHOLOGIST: CPT | Performed by: STUDENT IN AN ORGANIZED HEALTH CARE EDUCATION/TRAINING PROGRAM

## 2025-02-07 RX ORDER — ONDANSETRON 2 MG/ML
4 INJECTION INTRAMUSCULAR; INTRAVENOUS EVERY 6 HOURS PRN
Status: DISCONTINUED | OUTPATIENT
Start: 2025-02-07 | End: 2025-02-07 | Stop reason: HOSPADM

## 2025-02-07 RX ORDER — ONDANSETRON 2 MG/ML
4 INJECTION INTRAMUSCULAR; INTRAVENOUS ONCE AS NEEDED
Status: DISCONTINUED | OUTPATIENT
Start: 2025-02-07 | End: 2025-02-07 | Stop reason: HOSPADM

## 2025-02-07 RX ORDER — KETOROLAC TROMETHAMINE 30 MG/ML
INJECTION, SOLUTION INTRAMUSCULAR; INTRAVENOUS AS NEEDED
Status: DISCONTINUED | OUTPATIENT
Start: 2025-02-07 | End: 2025-02-07

## 2025-02-07 RX ORDER — FENTANYL CITRATE/PF 50 MCG/ML
25 SYRINGE (ML) INJECTION
Status: DISCONTINUED | OUTPATIENT
Start: 2025-02-07 | End: 2025-02-07 | Stop reason: HOSPADM

## 2025-02-07 RX ORDER — BUPIVACAINE HYDROCHLORIDE 2.5 MG/ML
INJECTION, SOLUTION EPIDURAL; INFILTRATION; INTRACAUDAL AS NEEDED
Status: DISCONTINUED | OUTPATIENT
Start: 2025-02-07 | End: 2025-02-07 | Stop reason: HOSPADM

## 2025-02-07 RX ORDER — LIDOCAINE HYDROCHLORIDE 20 MG/ML
INJECTION, SOLUTION EPIDURAL; INFILTRATION; INTRACAUDAL; PERINEURAL AS NEEDED
Status: DISCONTINUED | OUTPATIENT
Start: 2025-02-07 | End: 2025-02-07

## 2025-02-07 RX ORDER — MAGNESIUM HYDROXIDE 1200 MG/15ML
LIQUID ORAL AS NEEDED
Status: DISCONTINUED | OUTPATIENT
Start: 2025-02-07 | End: 2025-02-07 | Stop reason: HOSPADM

## 2025-02-07 RX ORDER — ONDANSETRON 2 MG/ML
INJECTION INTRAMUSCULAR; INTRAVENOUS AS NEEDED
Status: DISCONTINUED | OUTPATIENT
Start: 2025-02-07 | End: 2025-02-07

## 2025-02-07 RX ORDER — MIDAZOLAM HYDROCHLORIDE 2 MG/2ML
INJECTION, SOLUTION INTRAMUSCULAR; INTRAVENOUS AS NEEDED
Status: DISCONTINUED | OUTPATIENT
Start: 2025-02-07 | End: 2025-02-07

## 2025-02-07 RX ORDER — METRONIDAZOLE 500 MG/100ML
500 INJECTION, SOLUTION INTRAVENOUS ONCE
Status: COMPLETED | OUTPATIENT
Start: 2025-02-07 | End: 2025-02-07

## 2025-02-07 RX ORDER — PROPOFOL 10 MG/ML
INJECTION, EMULSION INTRAVENOUS AS NEEDED
Status: DISCONTINUED | OUTPATIENT
Start: 2025-02-07 | End: 2025-02-07

## 2025-02-07 RX ORDER — ROCURONIUM BROMIDE 10 MG/ML
INJECTION, SOLUTION INTRAVENOUS AS NEEDED
Status: DISCONTINUED | OUTPATIENT
Start: 2025-02-07 | End: 2025-02-07

## 2025-02-07 RX ORDER — DEXAMETHASONE SODIUM PHOSPHATE 10 MG/ML
INJECTION, SOLUTION INTRAMUSCULAR; INTRAVENOUS AS NEEDED
Status: DISCONTINUED | OUTPATIENT
Start: 2025-02-07 | End: 2025-02-07

## 2025-02-07 RX ORDER — SODIUM CHLORIDE, SODIUM LACTATE, POTASSIUM CHLORIDE, CALCIUM CHLORIDE 600; 310; 30; 20 MG/100ML; MG/100ML; MG/100ML; MG/100ML
125 INJECTION, SOLUTION INTRAVENOUS CONTINUOUS
Status: DISCONTINUED | OUTPATIENT
Start: 2025-02-07 | End: 2025-02-07 | Stop reason: HOSPADM

## 2025-02-07 RX ORDER — OXYCODONE HYDROCHLORIDE 5 MG/1
5 TABLET ORAL EVERY 4 HOURS PRN
Qty: 10 TABLET | Refills: 0 | Status: SHIPPED | OUTPATIENT
Start: 2025-02-07

## 2025-02-07 RX ORDER — ACETAMINOPHEN 325 MG/1
975 TABLET ORAL EVERY 6 HOURS PRN
Status: DISCONTINUED | OUTPATIENT
Start: 2025-02-07 | End: 2025-02-07 | Stop reason: HOSPADM

## 2025-02-07 RX ORDER — HYDROMORPHONE HCL/PF 1 MG/ML
0.5 SYRINGE (ML) INJECTION
Status: DISCONTINUED | OUTPATIENT
Start: 2025-02-07 | End: 2025-02-07 | Stop reason: HOSPADM

## 2025-02-07 RX ORDER — FENTANYL CITRATE 50 UG/ML
INJECTION, SOLUTION INTRAMUSCULAR; INTRAVENOUS AS NEEDED
Status: DISCONTINUED | OUTPATIENT
Start: 2025-02-07 | End: 2025-02-07

## 2025-02-07 RX ORDER — CEFAZOLIN SODIUM 2 G/50ML
2000 SOLUTION INTRAVENOUS ONCE
Status: COMPLETED | OUTPATIENT
Start: 2025-02-07 | End: 2025-02-07

## 2025-02-07 RX ADMIN — METRONIDAZOLE: 500 INJECTION, SOLUTION INTRAVENOUS at 09:41

## 2025-02-07 RX ADMIN — FENTANYL CITRATE 50 MCG: 50 INJECTION INTRAMUSCULAR; INTRAVENOUS at 09:17

## 2025-02-07 RX ADMIN — LIDOCAINE HYDROCHLORIDE 100 MG: 20 INJECTION, SOLUTION EPIDURAL; INFILTRATION; INTRACAUDAL at 09:17

## 2025-02-07 RX ADMIN — ROCURONIUM 20 MG: 50 INJECTION, SOLUTION INTRAVENOUS at 10:46

## 2025-02-07 RX ADMIN — FENTANYL CITRATE 50 MCG: 50 INJECTION INTRAMUSCULAR; INTRAVENOUS at 11:18

## 2025-02-07 RX ADMIN — ONDANSETRON 4 MG: 2 INJECTION INTRAMUSCULAR; INTRAVENOUS at 11:22

## 2025-02-07 RX ADMIN — ROCURONIUM 50 MG: 50 INJECTION, SOLUTION INTRAVENOUS at 09:17

## 2025-02-07 RX ADMIN — SUGAMMADEX 200 MG: 100 INJECTION, SOLUTION INTRAVENOUS at 11:34

## 2025-02-07 RX ADMIN — SODIUM CHLORIDE, SODIUM LACTATE, POTASSIUM CHLORIDE, AND CALCIUM CHLORIDE 125 ML/HR: .6; .31; .03; .02 INJECTION, SOLUTION INTRAVENOUS at 07:38

## 2025-02-07 RX ADMIN — PROPOFOL 180 MG: 10 INJECTION, EMULSION INTRAVENOUS at 09:17

## 2025-02-07 RX ADMIN — CEFAZOLIN SODIUM 2000 MG: 2 SOLUTION INTRAVENOUS at 09:21

## 2025-02-07 RX ADMIN — SODIUM CHLORIDE, SODIUM LACTATE, POTASSIUM CHLORIDE, AND CALCIUM CHLORIDE: .6; .31; .03; .02 INJECTION, SOLUTION INTRAVENOUS at 10:07

## 2025-02-07 RX ADMIN — KETOROLAC TROMETHAMINE 30 MG: 30 INJECTION, SOLUTION INTRAMUSCULAR; INTRAVENOUS at 11:23

## 2025-02-07 RX ADMIN — ROCURONIUM 20 MG: 50 INJECTION, SOLUTION INTRAVENOUS at 10:10

## 2025-02-07 RX ADMIN — MIDAZOLAM 2 MG: 1 INJECTION INTRAMUSCULAR; INTRAVENOUS at 09:06

## 2025-02-07 RX ADMIN — DEXAMETHASONE SODIUM PHOSPHATE 8 MG: 10 INJECTION INTRAMUSCULAR; INTRAVENOUS at 09:17

## 2025-02-07 RX ADMIN — ACETAMINOPHEN 325MG 975 MG: 325 TABLET ORAL at 14:28

## 2025-02-07 NOTE — OP NOTE
OPERATIVE REPORT  PATIENT NAME: Mirlande Eldridge    :  1985  MRN: [id number]  Pt Location: [facility] OR ROOM 04    SURGERY DATE: 2025    Surgeons and Role:     * Yardlie Toussaint-Foster, DO - Primary     * Lalitha Chandler MD - Assisting    Preop Diagnosis:  Dysplasia of cervix [N87.9]  Cervical high risk HPV (human papillomavirus) test positive [R87.810]    Post-Op Diagnosis Codes:     * Dysplasia of cervix [N87.9]     * Cervical high risk HPV (human papillomavirus) test positive [R87.810]    Procedure(s) (LRB):  TOTAL LAPAROSCOPIC HYSTERECTOMY, EUA, CYSTO (Bilateral)    Specimen(s):  ID Type Source Tests Collected by Time Destination   1 : Uterus and Cervix Tissue Uterus TISSUE EXAM Yardlie Toussaint-Foster, DO 2025 1054        Surgical EBL:  100 mL     IV Fluids:   LR 1000 mL     Drains:  350 mL     Anesthesia Type:   General, ET     Operative Indications:  Dysplasia of cervix [N87.9]  Cervical high risk HPV (human papillomavirus) test positive [R87.810]     Operative Findings:  Grossly normal appearing external female genitalia  Multiparous appearing cervix without lacerations or lesions   Uterus sounded to 8 cm   No injury to bowel or surrounding tissue at the location of first trocar placement   Congested adnexal vasculature  Grossly normal appearing bilateral ovaries, left ovary with follicular cyst     Uterus with 3 cm fibroid on anterior, right surface of fundus, two sub-centimeter fibroids noted on anterior surface of the uterus   Diffuse, thick adipose pads noted on the anterior surface of the abdominal wall   Surgically absent fallopian tubes consistent with prior salpingectomy   Cystoscopy at conclusion of case with bubble noted at dome of bladder, efflux noted from the ureters bilaterally, no lacerations or lesions of the lower urinary tract   Hemostatic hysterectomy pedicles under low pressure   Small area of oozing on the superficial tissue overlying the bladder serosal, hemostatic  after application of surgicel powder     Complications:   None Apparent     Procedure and Technique:  INDICATIONS: The patient is a 40 year old  with cervical dysplasia and HPV16 positive. Recent endometrial biopsy was negative. She is certain she has completed childbearing. Patient understands the risks, benefits, and alternatives of abdominal hysterectomy including bleeding, infection, damage to surrounding organs and tissues, pain, scarring, dyspareunia, fistula formation, urinary incontinence, deep vein thrombosis, pulmonary embolus, need for further surgery, need for blood transfusion, complications that cannot be predicted or prevented and the course of which cannot be predicted, and death. She desired to proceed.    DESCRIPTION OF PROCEDURE: The patient was taken to the operating room where she was properly identified and general anesthesia was obtained without difficulty. The patient was given prophylactic intravenous antibiotics.      The patient was prepped and draped in the usual sterile manner in the dorsal lithotomy position using the stirrups. Care was taken to avoid excessive flexion or extension of the patient's hips and knees and pressure on her extremities. A time out was performed and the above information confirmed.    A calvin catheter was inserted into the bladder and a weighted speculum was placed into the vagina. The ricardo retractor was placed into the vagina, and the anterior portion of the cervix was grasped with a single tooth tenaculum. The uterus sounded to 8cm. Stay sutures were placed on the cervix on the lateral aspects, and the Mahsa manipulator was placed. The single tooth tenaculum and the weighted speculum were then removed from the vagina. The Mahsa tip and vaginal cuff occluder were inflated.      Surgeons gloves were then changed, and attention was then turned to the abdomen, where  local was injected infraumbilically.  A small incision was then made infraumbilically,  and a 5mm trocar and sleeve were inserted through the incision into the peritoneum without difficulty. Laparoscopic visualization confirmed the intraperitoneal insertion of the port. Pneumoperitoneum was then established and maintained using carbon dioxide to a maximum of 15 mmHg.     There were noted to be multiple pads/appendages of adipose tissue on the anterior abdominal wall that were visible surrounding the laparoscope concerning for adhesions. Given concern for adhesions surrounding the initial trocar sites, a site just superior to the left ASIS was selected. Local was infiltrated and a 5 mm incision was made for insertion of a 5 mm trocar. Camera was then switched to new trocar and surrounding adiposity was confirmed to just be pads of adipose tissue and not related to or connected to bowel.     Subsequently, after infiltrating the areas with local, two additional small incisions were made in both the right and left lower quadrants, approximately 3 cm above and 3 cm medial to the anterior superior iliac spine. Two ports (5mm, 5mm) were introduced under direct visualization.      The left ureter was identified to be well out of the surgical field. The Ligasure dissecting instrument was then used to coagulate and cut the round ligament with subsequent coagulation and cutting of the left utero-ovarian ligament. The broad ligament was then coagulated and cut to the level of the uterine arteries. The Ligasure was then used to coagulate the uterine artery along the left. A bladder flap was then created and dissected approximately half of the way across the uterus at the level of the lower uterine segment. The bladder was pushed down.      The instruments were then switched in the ports and using the Ligasure we coagulated and cut the round ligament and then coagulated and cut down the right utero-ovarian ligament. The broad ligament was then coagulated and cut to the level of the uterine arteries.  The ligasure  was used to coagulate the uterine artery on the right. A bladder flap was then completed using the ligasure coming across the uterus to meet the incision from the left side.     There were noted to filmy tissue overlying the lower uterine segment. Using monopolar hook, the tissue was scored and further pushed down     At this point, the uterus was visualized and noted to be blanched. The colpotomy was performed with the monopolar hook. Once the colpotomy was completed, the attention was then turned back to the perineum where the vaginal cuff occluder was deflated, and the uterus and cervix were removed vaginally. The occluder balloon was placed in the vagina to maintain pneumoperitoneum.     At this point, attention was then turned back to the abdomen where a stratifix suture was placed into the abdomen through the 10 port, and the vaginal cuff was closed laparoscopically in a running fashion with 2 back stitches placed. The suture was cut, and the needle and suture was removed from the body.     The abdomen and pelvis was visualized, irrigated, and good hemostasis was noted. Good hemostasis was once again noted. Pneumoperitoneum was then evacuated and the trocars were removed. The trocar incisions were closed using 4.0 monocryl and exofin was placed. The calvin was removed and cystoscopy was performed. Bilateral ureteral jets were visualized, as was the bladder dome. No damage to the bladder was visualized on cystoscopy. The bladder was drained and the cystoscope was removed. All instruments were then removed from the vagina.    The patient tolerated the procedure well and was transferred to the recovery room in stable condition. All needle, sponge, and instrument counts were noted to be correct x 2 at the end of the procedure.       Patient Disposition:  PACU         SIGNATURE: Lalitha Chandler MD  DATE: February 7, 2025  TIME: 11:48 AM

## 2025-02-07 NOTE — ANESTHESIA POSTPROCEDURE EVALUATION
Post-Op Assessment Note    Last Filed PACU Vitals:  Vitals Value Taken Time   Temp 97.5 °F (36.4 °C) 02/07/25 1212   Pulse 74 02/07/25 1214   /67 02/07/25 1213   Resp 15 02/07/25 1212   SpO2 99 % 02/07/25 1214   Vitals shown include unfiled device data.    Modified Lashell:     Vitals Value Taken Time   Activity 2 02/07/25 1212   Respiration 2 02/07/25 1212   Circulation 2 02/07/25 1212   Consciousness 1 02/07/25 1212   Oxygen Saturation 2 02/07/25 1212     Modified Lashell Score: 9

## 2025-02-07 NOTE — ANESTHESIA POSTPROCEDURE EVALUATION
Post-Op Assessment Note    CV Status:  Stable  Pain Score: 0    Pain management: adequate    Multimodal analgesia used between 6 hours prior to anesthesia start to PACU discharge    Mental Status:  Sleepy and arousable   Hydration Status:  Stable   PONV Controlled:  None   Airway Patency:  Patent     Post Op Vitals Reviewed: Yes    No anethesia notable event occurred.    Staff: CRNA           Last Filed PACU Vitals:  Vitals Value Taken Time   Temp 97.5 °F (36.4 °C) 02/07/25 1142   Pulse 82 02/07/25 1144   /61 02/07/25 1143   Resp 15 02/07/25 1142   SpO2 97 % 02/07/25 1144   Vitals shown include unfiled device data.    Modified Lashell:     Vitals Value Taken Time   Activity 2 02/07/25 1142   Respiration 2 02/07/25 1142   Circulation 2 02/07/25 1142   Consciousness 1 02/07/25 1142   Oxygen Saturation 2 02/07/25 1142     Modified Lashell Score: 9

## 2025-02-13 PROCEDURE — 88342 IMHCHEM/IMCYTCHM 1ST ANTB: CPT | Performed by: STUDENT IN AN ORGANIZED HEALTH CARE EDUCATION/TRAINING PROGRAM

## 2025-02-13 PROCEDURE — 88309 TISSUE EXAM BY PATHOLOGIST: CPT | Performed by: STUDENT IN AN ORGANIZED HEALTH CARE EDUCATION/TRAINING PROGRAM

## 2025-02-20 ENCOUNTER — OFFICE VISIT (OUTPATIENT)
Dept: OBGYN CLINIC | Facility: CLINIC | Age: 40
End: 2025-02-20

## 2025-02-20 VITALS
DIASTOLIC BLOOD PRESSURE: 60 MMHG | HEIGHT: 65 IN | SYSTOLIC BLOOD PRESSURE: 98 MMHG | BODY MASS INDEX: 28.46 KG/M2 | WEIGHT: 170.8 LBS

## 2025-02-20 DIAGNOSIS — Z90.710 S/P LAPAROSCOPIC HYSTERECTOMY: ICD-10-CM

## 2025-02-20 DIAGNOSIS — Z09 POSTOPERATIVE EXAMINATION: Primary | ICD-10-CM

## 2025-02-20 PROCEDURE — 99024 POSTOP FOLLOW-UP VISIT: CPT | Performed by: OBSTETRICS & GYNECOLOGY

## 2025-02-20 NOTE — PROGRESS NOTES
"Subjective     Mirlande Eldridge is a 40 y.o. female who presents to the clinic 2 weeks for incision check status post  EUA, TLH, Cystoscopy  for  Cervical dysplasia . Eating a regular diet without difficulty. Bowel movements are normal. The patient is not having any pain.    The following portions of the patient's history were reviewed and updated as appropriate: allergies, current medications, past family history, past medical history, past social history, past surgical history, and problem list.    Review of Systems  Pertinent items are noted in HPI.      Objective     BP 98/60 (BP Location: Left arm, Patient Position: Sitting, Cuff Size: Standard)   Ht 5' 5\" (1.651 m)   Wt 77.5 kg (170 lb 12.8 oz)   LMP 01/07/2025 (Approximate)   BMI 28.42 kg/m²   General:  alert and oriented, in no acute distress   Abdomen: soft, bowel sounds active, non-tender   Incision:   healing well, no drainage, no erythema, no hernia, no seroma, no swelling, no dehiscence, incision well approximated         Assessment      Doing well postoperatively.  Operative findings again reviewed. Pathology report discussed.      Plan     1. Continue any current medications if indicated.  2. Wound care discussed.  3. Activity restrictions:  as stated on postop instruction sheet  4. Anticipated return to work:  6wks .  5. Follow up: 4 weeks for postop exam   "

## 2025-03-18 ENCOUNTER — OFFICE VISIT (OUTPATIENT)
Dept: OBGYN CLINIC | Facility: CLINIC | Age: 40
End: 2025-03-18

## 2025-03-18 VITALS — SYSTOLIC BLOOD PRESSURE: 122 MMHG | DIASTOLIC BLOOD PRESSURE: 76 MMHG | WEIGHT: 181.2 LBS | BODY MASS INDEX: 30.15 KG/M2

## 2025-03-18 DIAGNOSIS — Z09 POSTOPERATIVE EXAMINATION: Primary | ICD-10-CM

## 2025-03-18 PROCEDURE — 99024 POSTOP FOLLOW-UP VISIT: CPT | Performed by: OBSTETRICS & GYNECOLOGY

## 2025-03-18 NOTE — PROGRESS NOTES
Subjective     Mirlande Eldridge is a 40 y.o. female who presents to the clinic 6 weeks status post  EUA, TLH, Cystoscopy   for  Cervical dysplasia . Eating a regular diet without difficulty. Bowel movements are normal. The patient is not having any pain.    The following portions of the patient's history were reviewed and updated as appropriate: allergies, current medications, past family history, past medical history, past social history, past surgical history, and problem list.    Review of Systems  Pertinent items are noted in HPI.      Objective     /76 (BP Location: Left arm, Patient Position: Sitting)   Wt 82.2 kg (181 lb 3.2 oz)   BMI 30.15 kg/m²   General:  alert and oriented, in no acute distress   Abdomen: soft, bowel sounds active, non-tender   Vaginal cuff:  Well healed         Assessment      Doing well postoperatively.  Operative findings again reviewed. Pathology report discussed.      Plan     1. Continue any current medications.  2. Wound care discussed.  3. Activity restrictions: none  4. Anticipated return to work: not applicable.  5. Follow up: 5  months  for pap protocol repap

## 2025-05-02 ENCOUNTER — TELEPHONE (OUTPATIENT)
Age: 40
End: 2025-05-02

## 2025-05-02 NOTE — TELEPHONE ENCOUNTER
Contacted patient off of [facility]  wait list to verify needs of services in attempts to update list with patient preferences. spoke with patient whom stated she already found talk therapy elsewhere and will no longer need services at this time. Patient removed off the wait list.

## 2025-06-02 ENCOUNTER — CLINICAL SUPPORT (OUTPATIENT)
Dept: FAMILY MEDICINE CLINIC | Facility: CLINIC | Age: 40
End: 2025-06-02

## 2025-06-02 DIAGNOSIS — Z23 ENCOUNTER FOR IMMUNIZATION: Primary | ICD-10-CM

## 2025-06-02 PROCEDURE — 90471 IMMUNIZATION ADMIN: CPT

## 2025-06-02 PROCEDURE — 90746 HEPB VACCINE 3 DOSE ADULT IM: CPT

## (undated) DEVICE — PREMIUM DRY TRAY LF: Brand: MEDLINE INDUSTRIES, INC.

## (undated) DEVICE — SUT STRATAFIX SPIRAL PDS PLUS 2-0 CT-1 9IN SXPP1B456

## (undated) DEVICE — GLOVE PI ULTRA TOUCH SZ 6

## (undated) DEVICE — NEEDLE 25G X 1 1/2

## (undated) DEVICE — TROCAR: Brand: KII SLEEVE

## (undated) DEVICE — INTENDED FOR TISSUE SEPARATION, AND OTHER PROCEDURES THAT REQUIRE A SHARP SURGICAL BLADE TO PUNCTURE OR CUT.: Brand: BARD-PARKER SAFETY BLADES SIZE 11, STERILE

## (undated) DEVICE — GLOVE INDICATOR PI UNDERGLOVE SZ 6.5 BLUE

## (undated) DEVICE — IRRIG ENDO FLO TUBING

## (undated) DEVICE — SUT VICRYL 0 CT-1 36 IN J946H

## (undated) DEVICE — MARYLAND JAW LAPAROSCOPIC SEALER/DIVIDER COATED: Brand: LIGASURE

## (undated) DEVICE — SCD SEQUENTIAL COMPRESSION COMFORT SLEEVE MEDIUM KNEE LENGTH: Brand: KENDALL SCD

## (undated) DEVICE — STRL COTTON TIP APPLCTR 6IN PK: Brand: CARDINAL HEALTH

## (undated) DEVICE — [HIGH FLOW INSUFFLATOR,  DO NOT USE IF PACKAGE IS DAMAGED,  KEEP DRY,  KEEP AWAY FROM SUNLIGHT,  PROTECT FROM HEAT AND RADIOACTIVE SOURCES.]: Brand: PNEUMOSURE

## (undated) DEVICE — EXOFIN PRECISION PEN HIGH VISCOSITY TOPICAL SKIN ADHESIVE: Brand: EXOFIN PRECISION PEN, 1G

## (undated) DEVICE — ASTOUND STANDARD SURGICAL GOWN, XL: Brand: CONVERTORS

## (undated) DEVICE — LAPAROSCOPIC SMOKE EVAC TUBING

## (undated) DEVICE — IV SET EXT 30 IN

## (undated) DEVICE — HEMOSTAT POWDER ADSORB SURGICEL 3GM

## (undated) DEVICE — ELECTRODE LAP J HOOK SPLIT STEM E-Z CLEAN 33CM -0021S

## (undated) DEVICE — LAPAROTOMY DRAPE WITH POUCHES: Brand: CONVERTORS

## (undated) DEVICE — 3000CC GUARDIAN II: Brand: GUARDIAN

## (undated) DEVICE — REM POLYHESIVE ADULT PATIENT RETURN ELECTRODE: Brand: VALLEYLAB

## (undated) DEVICE — BETHLEHEM UNIVERSAL GYN LAP PK: Brand: CARDINAL HEALTH

## (undated) DEVICE — SYRINGE 50ML LL

## (undated) DEVICE — 40583 XL ADVANCED TRENDELENBURG POSITIONING KIT: Brand: 40583 XL ADVANCED TRENDELENBURG POSITIONING KIT

## (undated) DEVICE — PLASTIC ADHESIVE BANDAGE: Brand: CURITY

## (undated) DEVICE — METZENBAUM ADTEC SINGLE USE DISSECTING SCISSORS, SHAFT ONLY, MONOPOLAR, CURVED TO LEFT, WORKING LENGTH: 12 1/4", (310 MM), DIAM. 5 MM, INSULATED, DOUBLE ACTION, STERILE, DISPOSABLE, PACKAGE OF 10 PIECES: Brand: AESCULAP

## (undated) DEVICE — TRAY FOLEY 16FR URIMETER SILICONE SURESTEP

## (undated) DEVICE — APPLICATOR ENDO SURGICEL

## (undated) DEVICE — OCCLUDER COLPO-PNEUMO

## (undated) DEVICE — SUT MONOCRYL 4-0 PS-2 27 IN Y426H

## (undated) DEVICE — TROCAR: Brand: KII FIOS FIRST ENTRY

## (undated) DEVICE — CHLORAPREP HI-LITE 26ML ORANGE

## (undated) DEVICE — DRAPE EQUIPMENT RF WAND

## (undated) DEVICE — MAYO STAND COVER: Brand: CONVERTORS

## (undated) DEVICE — UTERINE MANIPULATOR RUMI 6.7 X 8 CM

## (undated) DEVICE — BLUE HEAT SCOPE WARMER

## (undated) DEVICE — ADHESIVE SKIN HIGH VISCOSITY EXOFIN 1ML

## (undated) DEVICE — PVC URETHRAL CATHETER: Brand: DOVER

## (undated) DEVICE — DRAPE TOWEL: Brand: CONVERTORS

## (undated) DEVICE — ENSEAL X1 STRAIGHT 37CM SHAFT: Brand: HARMONIC

## (undated) DEVICE — ENDOPATH 5MM ENDOSCOPIC BLUNT TIP DISSECTORS (12 POUCHES CONTAINING 3 DISSECTORS EACH): Brand: ENDOPATH

## (undated) DEVICE — ELECTRODE LAP J HOOK E-Z CLEAN 33CM-0021